# Patient Record
Sex: FEMALE | Race: WHITE | NOT HISPANIC OR LATINO | Employment: UNEMPLOYED | URBAN - METROPOLITAN AREA
[De-identification: names, ages, dates, MRNs, and addresses within clinical notes are randomized per-mention and may not be internally consistent; named-entity substitution may affect disease eponyms.]

---
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
## 2019-02-14 ENCOUNTER — INITIAL PRENATAL (OUTPATIENT)
Dept: OBSTETRICS AND GYNECOLOGY | Facility: CLINIC | Age: 19
End: 2019-02-14

## 2019-02-14 VITALS
SYSTOLIC BLOOD PRESSURE: 101 MMHG | BODY MASS INDEX: 22.99 KG/M2 | DIASTOLIC BLOOD PRESSURE: 73 MMHG | HEIGHT: 65 IN | WEIGHT: 138 LBS

## 2019-02-14 DIAGNOSIS — Z11.3 SCREEN FOR STD (SEXUALLY TRANSMITTED DISEASE): ICD-10-CM

## 2019-02-14 DIAGNOSIS — Z12.4 SCREENING FOR CERVICAL CANCER: ICD-10-CM

## 2019-02-14 DIAGNOSIS — O21.9 NAUSEA AND VOMITING DURING PREGNANCY: ICD-10-CM

## 2019-02-14 DIAGNOSIS — N92.6 MISSED MENSES: Primary | ICD-10-CM

## 2019-02-14 DIAGNOSIS — O99.331 TOBACCO SMOKING AFFECTING PREGNANCY IN FIRST TRIMESTER: ICD-10-CM

## 2019-02-14 DIAGNOSIS — O36.80X0 PREGNANCY WITH UNCERTAIN FETAL VIABILITY, SINGLE OR UNSPECIFIED FETUS: ICD-10-CM

## 2019-02-14 DIAGNOSIS — Z11.4 SCREENING FOR HIV (HUMAN IMMUNODEFICIENCY VIRUS): ICD-10-CM

## 2019-02-14 DIAGNOSIS — Z02.83 ENCOUNTER FOR DRUG SCREENING: ICD-10-CM

## 2019-02-14 PROBLEM — Z86.19 HISTORY OF HERPES GENITALIS: Status: ACTIVE | Noted: 2019-02-14

## 2019-02-14 LAB
B-HCG UR QL: POSITIVE
INTERNAL NEGATIVE CONTROL: NEGATIVE
INTERNAL POSITIVE CONTROL: POSITIVE
Lab: ABNORMAL

## 2019-02-14 PROCEDURE — 81025 URINE PREGNANCY TEST: CPT | Performed by: OBSTETRICS & GYNECOLOGY

## 2019-02-14 PROCEDURE — 99214 OFFICE O/P EST MOD 30 MIN: CPT | Performed by: OBSTETRICS & GYNECOLOGY

## 2019-02-14 PROCEDURE — 99406 BEHAV CHNG SMOKING 3-10 MIN: CPT | Performed by: OBSTETRICS & GYNECOLOGY

## 2019-02-14 RX ORDER — DIPHENHYDRAMINE HYDROCHLORIDE 25 MG/1
25 CAPSULE ORAL 3 TIMES DAILY
Qty: 90 TABLET | Refills: 2 | Status: SHIPPED | OUTPATIENT
Start: 2019-02-14 | End: 2019-09-03

## 2019-02-14 RX ORDER — CALCIUM CITRATE, IRON PENTACARBONYL, CHOLECALCIFEROL, .ALPHA.-TOCOPHEROL, DL-, PYRIDOXINE HYDROCHLORIDE, FOLIC ACID, DOCUSATE SODIUM, AND DOCONEXENT 104; 27; 400; 30; 25; 1; 50; 260 MG/1; MG/1; [IU]/1; [IU]/1; MG/1; MG/1; MG/1; MG/1
1 CAPSULE, GELATIN COATED ORAL DAILY
Qty: 30 CAPSULE | Refills: 11 | Status: SHIPPED | OUTPATIENT
Start: 2019-02-14 | End: 2019-11-06

## 2019-02-14 NOTE — PATIENT INSTRUCTIONS
Prenatal Care  WHAT IS PRENATAL CARE?  Prenatal care is the process of caring for a pregnant woman before she gives birth. Prenatal care makes sure that she and her baby remain as healthy as possible throughout pregnancy. Prenatal care may be provided by a midwife, family practice health care provider, or a childbirth and pregnancy specialist (obstetrician). Prenatal care may include physical examinations, testing, treatments, and education on nutrition, lifestyle, and social support services.  WHY IS PRENATAL CARE SO IMPORTANT?  Early and consistent prenatal care increases the chance that you and your baby will remain healthy throughout your pregnancy. This type of care also decreases a baby’s risk of being born too early (prematurely), or being born smaller than expected (small for gestational age). Any underlying medical conditions you may have that could pose a risk during your pregnancy are discussed during prenatal care visits. You will also be monitored regularly for any new conditions that may arise during your pregnancy so they can be treated quickly and effectively.  WHAT HAPPENS DURING PRENATAL CARE VISITS?  Prenatal care visits may include the following:  Discussion  Tell your health care provider about any new signs or symptoms you have experienced since your last visit. These might include:  · Nausea or vomiting.  · Increased or decreased level of energy.  · Difficulty sleeping.  · Back or leg pain.  · Weight changes.  · Frequent urination.  · Shortness of breath with physical activity.  · Changes in your skin, such as the development of a rash or itchiness.  · Vaginal discharge or bleeding.  · Feelings of excitement or nervousness.  · Changes in your baby’s movements.    You may want to write down any questions or topics you want to discuss with your health care provider and bring them with you to your appointment.  Examination  During your first prenatal care visit, you will likely have a complete  physical exam. Your health care provider will often examine your vagina, cervix, and the position of your uterus, as well as check your heart, lungs, and other body systems. As your pregnancy progresses, your health care provider will measure the size of your uterus and your baby’s position inside your uterus. He or she may also examine you for early signs of labor. Your prenatal visits may also include checking your blood pressure and, after about 10-12 weeks of pregnancy, listening to your baby’s heartbeat.  Testing  Regular testing often includes:  · Urinalysis. This checks your urine for glucose, protein, or signs of infection.  · Blood count. This checks the levels of white and red blood cells in your body.  · Tests for sexually transmitted infections (STIs). Testing for STIs at the beginning of pregnancy is routinely done and is required in many states.  · Antibody testing. You will be checked to see if you are immune to certain illnesses, such as rubella, that can affect a developing fetus.  · Glucose screen. Around 24-28 weeks of pregnancy, your blood glucose level will be checked for signs of gestational diabetes. Follow-up tests may be recommended.  · Group B strep. This is a bacteria that is commonly found inside a woman’s vagina. This test will inform your health care provider if you need an antibiotic to reduce the amount of this bacteria in your body prior to labor and childbirth.  · Ultrasound. Many pregnant women undergo an ultrasound screening around 18-20 weeks of pregnancy to evaluate the health of the fetus and check for any developmental abnormalities.  · HIV (human immunodeficiency virus) testing. Early in your pregnancy, you will be screened for HIV. If you are at high risk for HIV, this test may be repeated during your third trimester of pregnancy.    You may be offered other testing based on your age, personal or family medical history, or other factors.  HOW OFTEN SHOULD I PLAN TO SEE MY  HEALTH CARE PROVIDER FOR PRENATAL CARE?  Your prenatal care check-up schedule depends on any medical conditions you have before, or develop during, your pregnancy. If you do not have any underlying medical conditions, you will likely be seen for checkups:  · Monthly, during the first 6 months of pregnancy.  · Twice a month during months 7 and 8 of pregnancy.  · Weekly starting in the 9th month of pregnancy and until delivery.    If you develop signs of early labor or other concerning signs or symptoms, you may need to see your health care provider more often. Ask your health care provider what prenatal care schedule is best for you.  WHAT CAN I DO TO KEEP MYSELF AND MY BABY AS HEALTHY AS POSSIBLE DURING MY PREGNANCY?  · Take a prenatal vitamin containing 400 micrograms (0.4 mg) of folic acid every day. Your health care provider may also ask you to take additional vitamins such as iodine, vitamin D, iron, copper, and zinc.  · Take 7660-9086 mg of calcium daily starting at your 20th week of pregnancy until you deliver your baby.  · Make sure you are up to date on your vaccinations. Unless directed otherwise by your health care provider:  ? You should receive a tetanus, diphtheria, and pertussis (Tdap) vaccination between the 27th and 36th week of your pregnancy, regardless of when your last Tdap immunization occurred. This helps protect your baby from whooping cough (pertussis) after he or she is born.  ? You should receive an annual inactivated influenza vaccine (IIV) to help protect you and your baby from influenza. This can be done at any point during your pregnancy.  · Eat a well-rounded diet that includes:  ? Fresh fruits and vegetables.  ? Lean proteins.  ? Calcium-rich foods such as milk, yogurt, hard cheeses, and dark, leafy greens.  ? Whole grain breads.  · Do not eat seafood high in mercury, including:  ? Swordfish.  ? Tilefish.  ? Shark.  ? Feroz mackerel.  ? More than 6 oz tuna per week.  · Do not  eat:  ? Raw or undercooked meats or eggs.  ? Unpasteurized foods, such as soft cheeses (brie, blue, or feta), juices, and milks.  ? Lunch meats.  ? Hot dogs that have not been heated until they are steaming.  · Drink enough water to keep your urine clear or pale yellow. For many women, this may be 10 or more 8 oz glasses of water each day. Keeping yourself hydrated helps deliver nutrients to your baby and may prevent the start of pre-term uterine contractions.  · Do not use any tobacco products including cigarettes, chewing tobacco, or electronic cigarettes. If you need help quitting, ask your health care provider.  · Do not drink beverages containing alcohol. No safe level of alcohol consumption during pregnancy has been determined.  · Do not use any illegal drugs. These can harm your developing baby or cause a miscarriage.  · Ask your health care provider or pharmacist before taking any prescription or over-the-counter medicines, herbs, or supplements.  · Limit your caffeine intake to no more than 200 mg per day.  · Exercise. Unless told otherwise by your health care provider, try to get 30 minutes of moderate exercise most days of the week. Do not  do high-impact activities, contact sports, or activities with a high risk of falling, such as horseback riding or downhill skiing.  · Get plenty of rest.  · Avoid anything that raises your body temperature, such as hot tubs and saunas.  · If you own a cat, do not empty its litter box. Bacteria contained in cat feces can cause an infection called toxoplasmosis. This can result in serious harm to the fetus.  · Stay away from chemicals such as insecticides, lead, mercury, and cleaning or paint products that contain solvents.  · Do not have any X-rays taken unless medically necessary.  · Take a childbirth and breastfeeding preparation class. Ask your health care provider if you need a referral or recommendation.    This information is not intended to replace advice given  to you by your health care provider. Make sure you discuss any questions you have with your health care provider.  Document Released: 12/20/2004 Document Revised: 05/22/2017 Document Reviewed: 03/04/2015  [employer] Interactive Patient Education © 2017 [employer]    For more information:    Quit Now [place]  1-800-QUIT-NOW  https://[place].quitlogix.org/en-[place]/  Steps to Quit Smoking  Smoking tobacco can be harmful to your health and can affect almost every organ in your body. Smoking puts you, and those around you, at risk for developing many serious chronic diseases. Quitting smoking is difficult, but it is one of the best things that you can do for your health. It is never too late to quit.  What are the benefits of quitting smoking?  When you quit smoking, you lower your risk of developing serious diseases and conditions, such as:  · Lung cancer or lung disease, such as COPD.  · Heart disease.  · Stroke.  · Heart attack.  · Infertility.  · Osteoporosis and bone fractures.  Additionally, symptoms such as coughing, wheezing, and shortness of breath may get better when you quit. You may also find that you get sick less often because your body is stronger at fighting off colds and infections. If you are pregnant, quitting smoking can help to reduce your chances of having a baby of low birth weight.  How do I get ready to quit?  When you decide to quit smoking, create a plan to make sure that you are successful. Before you quit:  · Pick a date to quit. Set a date within the next two weeks to give you time to prepare.  · Write down the reasons why you are quitting. Keep this list in places where you will see it often, such as on your bathroom mirror or in your car or wallet.  · Identify the people, places, things, and activities that make you want to smoke (triggers) and avoid them. Make sure to take these actions:  ¨ Throw away all cigarettes at home, at work, and in your car.  ¨ Throw away smoking accessories,  such as ashtrays and lighters.  ¨ Clean your car and make sure to empty the ashtray.  ¨ Clean your home, including curtains and carpets.  · Tell your family, friends, and coworkers that you are quitting. Support from your loved ones can make quitting easier.  · Talk with your health care provider about your options for quitting smoking.  · Find out what treatment options are covered by your health insurance.  What strategies can I use to quit smoking?  Talk with your healthcare provider about different strategies to quit smoking. Some strategies include:  · Quitting smoking altogether instead of gradually lessening how much you smoke over a period of time. Research shows that quitting “cold turkey” is more successful than gradually quitting.  · Attending in-person counseling to help you build problem-solving skills. You are more likely to have success in quitting if you attend several counseling sessions. Even short sessions of 10 minutes can be effective.  · Finding resources and support systems that can help you to quit smoking and remain smoke-free after you quit. These resources are most helpful when you use them often. They can include:  ¨ Online chats with a counselor.  ¨ Telephone quitlines.  ¨ Printed self-help materials.  ¨ Support groups or group counseling.  ¨ Text messaging programs.  ¨ Mobile phone applications.  · Taking medicines to help you quit smoking. (If you are pregnant or breastfeeding, talk with your health care provider first.) Some medicines contain nicotine and some do not. Both types of medicines help with cravings, but the medicines that include nicotine help to relieve withdrawal symptoms. Your health care provider may recommend:  ¨ Nicotine patches, gum, or lozenges.  ¨ Nicotine inhalers or sprays.  ¨ Non-nicotine medicine that is taken by mouth.  Talk with your health care provider about combining strategies, such as taking medicines while you are also receiving in-person counseling.  Using these two strategies together makes you more likely to succeed in quitting than if you used either strategy on its own.  If you are pregnant or breastfeeding, talk with your health care provider about finding counseling or other support strategies to quit smoking. Do not take medicine to help you quit smoking unless told to do so by your health care provider.  What things can I do to make it easier to quit?  Quitting smoking might feel overwhelming at first, but there is a lot that you can do to make it easier. Take these important actions:  · Reach out to your family and friends and ask that they support and encourage you during this time. Call telephone quitlines, reach out to support groups, or work with a counselor for support.  · Ask people who smoke to avoid smoking around you.  · Avoid places that trigger you to smoke, such as bars, parties, or smoke-break areas at work.  · Spend time around people who do not smoke.  · Lessen stress in your life, because stress can be a smoking trigger for some people. To lessen stress, try:  ¨ Exercising regularly.  ¨ Deep-breathing exercises.  ¨ Yoga.  ¨ Meditating.  ¨ Performing a body scan. This involves closing your eyes, scanning your body from head to toe, and noticing which parts of your body are particularly tense. Purposefully relax the muscles in those areas.  · Download or purchase mobile phone or tablet apps (applications) that can help you stick to your quit plan by providing reminders, tips, and encouragement. There are many free apps, such as QuitGuide from the CDC (Centers for Disease Control and Prevention). You can find other support for quitting smoking (smoking cessation) through smokefree.gov and other websites.  How will I feel when I quit smoking?  Within the first 24 hours of quitting smoking, you may start to feel some withdrawal symptoms. These symptoms are usually most noticeable 2-3 days after quitting, but they usually do not last beyond  2-3 weeks. Changes or symptoms that you might experience include:  · Mood swings.  · Restlessness, anxiety, or irritation.  · Difficulty concentrating.  · Dizziness.  · Strong cravings for sugary foods in addition to nicotine.  · Mild weight gain.  · Constipation.  · Nausea.  · Coughing or a sore throat.  · Changes in how your medicines work in your body.  · A depressed mood.  · Difficulty sleeping (insomnia).  After the first 2-3 weeks of quitting, you may start to notice more positive results, such as:  · Improved sense of smell and taste.  · Decreased coughing and sore throat.  · Slower heart rate.  · Lower blood pressure.  · Clearer skin.  · The ability to breathe more easily.  · Fewer sick days.  Quitting smoking is very challenging for most people. Do not get discouraged if you are not successful the first time. Some people need to make many attempts to quit before they achieve long-term success. Do your best to stick to your quit plan, and talk with your health care provider if you have any questions or concerns.  This information is not intended to replace advice given to you by your health care provider. Make sure you discuss any questions you have with your health care provider.  Document Released: 12/12/2002 Document Revised: 08/15/2017 Document Reviewed: 05/03/2016  [employer]vier Interactive Patient Education © 2017 [employer]

## 2019-02-15 LAB
ABO GROUP BLD: (no result)
ALBUMIN SERPL-MCNC: 4.7 G/DL (ref 3.5–5.5)
ALBUMIN/GLOB SERPL: 2 {RATIO} (ref 1.2–2.2)
ALP SERPL-CCNC: 75 IU/L (ref 39–117)
ALT SERPL-CCNC: 10 IU/L (ref 0–32)
AST SERPL-CCNC: 12 IU/L (ref 0–40)
BASOPHILS # BLD AUTO: 0 X10E3/UL (ref 0–0.2)
BASOPHILS NFR BLD AUTO: 0 %
BILIRUB SERPL-MCNC: 0.4 MG/DL (ref 0–1.2)
BLD GP AB SCN SERPL QL: NEGATIVE
BUN SERPL-MCNC: 6 MG/DL (ref 6–20)
BUN/CREAT SERPL: 9 (ref 9–23)
CALCIUM SERPL-MCNC: 9.8 MG/DL (ref 8.7–10.2)
CHLORIDE SERPL-SCNC: 105 MMOL/L (ref 96–106)
CO2 SERPL-SCNC: 18 MMOL/L (ref 20–29)
CREAT SERPL-MCNC: 0.64 MG/DL (ref 0.57–1)
EOSINOPHIL # BLD AUTO: 0.1 X10E3/UL (ref 0–0.4)
EOSINOPHIL NFR BLD AUTO: 1 %
ERYTHROCYTE [DISTWIDTH] IN BLOOD BY AUTOMATED COUNT: 13.1 % (ref 12.3–15.4)
GLOBULIN SER CALC-MCNC: 2.4 G/DL (ref 1.5–4.5)
GLUCOSE SERPL-MCNC: 84 MG/DL (ref 65–99)
HBV SURFACE AG SERPL QL IA: NEGATIVE
HCG INTACT+B SERPL-ACNC: NORMAL MIU/ML
HCT VFR BLD AUTO: 46 % (ref 34–46.6)
HCV AB S/CO SERPL IA: <0.1 S/CO RATIO (ref 0–0.9)
HGB BLD-MCNC: 15.8 G/DL (ref 11.1–15.9)
HIV 1+2 AB+HIV1 P24 AG SERPL QL IA: NON REACTIVE
IMM GRANULOCYTES # BLD AUTO: 0 X10E3/UL (ref 0–0.1)
IMM GRANULOCYTES NFR BLD AUTO: 0 %
LYMPHOCYTES # BLD AUTO: 2.5 X10E3/UL (ref 0.7–3.1)
LYMPHOCYTES NFR BLD AUTO: 19 %
MCH RBC QN AUTO: 31.3 PG (ref 26.6–33)
MCHC RBC AUTO-ENTMCNC: 34.3 G/DL (ref 31.5–35.7)
MCV RBC AUTO: 91 FL (ref 79–97)
MONOCYTES # BLD AUTO: 0.9 X10E3/UL (ref 0.1–0.9)
MONOCYTES NFR BLD AUTO: 7 %
NEUTROPHILS # BLD AUTO: 10 X10E3/UL (ref 1.4–7)
NEUTROPHILS NFR BLD AUTO: 73 %
PLATELET # BLD AUTO: 320 X10E3/UL (ref 150–379)
POTASSIUM SERPL-SCNC: 3.9 MMOL/L (ref 3.5–5.2)
PROT SERPL-MCNC: 7.1 G/DL (ref 6–8.5)
RBC # BLD AUTO: 5.05 X10E6/UL (ref 3.77–5.28)
RH BLD: NEGATIVE
RPR SER QL: NON REACTIVE
RUBV IGG SERPL IA-ACNC: <0.9 INDEX
SODIUM SERPL-SCNC: 139 MMOL/L (ref 134–144)
WBC # BLD AUTO: 13.5 X10E3/UL (ref 3.4–10.8)

## 2019-02-15 NOTE — PROGRESS NOTES
Chief complaint: Initial prenatal visit, nausea and vomiting  History of present illness: Patient presents for her initial pregnancy visit with me.  She has basically no idea when her last menstrual period was.  She says she does not think she has had a menstrual period for over a year.  She says that she had a Nexplanon taken out about a year ago and never have periods after that.  She states that she had a positive home pregnancy test around 19.  She started having some abdominal pains and nausea and vomiting, so she went to the emergency room at [facility] on 19.  At that visit, her hCG was only 728.  Ultrasound was performed but showed no intra-or extrauterine pregnancy.  Patient states that she has been taking Phenergan since then with not much improvement in her nausea and vomiting.  She denies any pelvic or abdominal pain at this time.  No bleeding.    Obstetric History       T1      L1     SAB0   TAB0   Ectopic0   Molar0   Multiple0   Live Births0       # Outcome Date GA Lbr Peter/2nd Weight Sex Delivery Anes PTL Lv   2 Current            1 Term  38w0d  3487 g (7 lb 11 oz) F Vag-Spont           History reviewed. No pertinent past medical history.    History reviewed. No pertinent surgical history.    History reviewed. No pertinent family history.    Social History     Tobacco Use   • Smoking status: Current Every Day Smoker     Packs/day: 1.00     Types: Cigarettes   • Smokeless tobacco: Never Used   Substance Use Topics   • Alcohol use: No     Frequency: Never   • Drug use: No       Current Outpatient Medications on File Prior to Visit   Medication Sig   • [DISCONTINUED] PRENATAL VIT-FE FUMARATE-FA PO Take  by mouth.     No current facility-administered medications on file prior to visit.      Allergies   Allergen Reactions   • Azithromycin Hives       ROS:  General: No fever or chills  Constitutional: No weight loss or gain, no hair loss  HENT: No headache, no hearing loss, no  "tinnitus  Eyes: normal vision, no eye pain  Lungs: No cough, no shortness of breath  Heart: No chest pain, no palpitations  Abdomen: Pos nausea, vomiting, No constipation or diarrhea  : No dysuria, no hematuria  Skin: No rashes  Lymph: No swelling  Neuro: No parathesia, no weakness  Psych: Normal though content, no hallucinations, no SI/HI    PE:  Vitals:    19 1137 19 1137   BP: 101/73    Weight: 62.6 kg (138 lb)    Height:  165.1 cm (65\")       See initial prenatal flow sheet for physical exam    Assessment:  1.  19-year-old  2 para 1 at about 6 weeks gestation based on history and physical exam today  2.  Nausea and vomiting in pregnancy  3.  Tobacco use in pregnancy    Plan:  .1. Initial prenatal counseling performed today. Hospital and call coverage system discussed. Pt is recommended to start PNV. Prenatal care educational handouts provided in after visit summary. OTC medications discussed. Discussed typical schedule of prenatal visits. Obtain US next available for dating. ROutine labs today with Pap, cultures, and OB profile panel.  2. It is unusual that patient would start having so much nausea and vomiting so early in her pregnancy.  When she was first experiencing the nausea and vomiting her hCG level was only 728.  Have to wonder if there is some other source of her nausea and vomiting.  Patient reports that she had nausea and vomiting throughout her entire first pregnancy as well. Discussed nausea and vomiting in pregnancy. Discussed diet and lifestyle modifications to help prevent nausea. Discussed use of vitamin B6 and doxylamine up to three times a day as first line pharmacologic therapy. Will send prescription to pharmacy. Weight gain expectations discussed with pt.  3.  The risk of tobacco use in general and especially in pregnancy was discussed with the patient.  Full cessation is encouraged.  Greater than 3 minutes spent in tobacco cessation counseling  I explained to the " patient that I will be leaving this practice after 6/30/19, and that likely she would need to transition to care to 1 of the other doctors in the practice.  She verbalized understanding.

## 2019-02-16 LAB
AMPHETAMINES SERPL QL SCN: NEGATIVE NG/ML
BACTERIA UR CULT: NORMAL
BACTERIA UR CULT: NORMAL
BARBITURATES SERPL QL SCN: NEGATIVE UG/ML
BENZODIAZ SERPL QL SCN: NEGATIVE NG/ML
CANNABINOIDS SERPL QL SCN: NEGATIVE NG/ML
COCAINE+BZE SERPL QL SCN: NEGATIVE NG/ML
METHADONE SERPL QL SCN: NEGATIVE NG/ML
OPIATES SERPL QL SCN: NEGATIVE NG/ML
OXYCODONE+OXYMORPHONE SERPLBLD QL SCN: NEGATIVE NG/ML
PCP SERPL QL SCN: NEGATIVE NG/ML
PROPOXYPH SERPL QL SCN: NEGATIVE NG/ML

## 2019-02-19 ENCOUNTER — PROCEDURE VISIT (OUTPATIENT)
Dept: OBSTETRICS AND GYNECOLOGY | Facility: CLINIC | Age: 19
End: 2019-02-19

## 2019-02-19 DIAGNOSIS — Z34.91 PREGNANCY WITH UNCERTAIN DATES IN FIRST TRIMESTER: Primary | ICD-10-CM

## 2019-02-19 LAB
C TRACH RRNA CVX QL NAA+PROBE: POSITIVE
CONV .: ABNORMAL
CYTOLOGIST CVX/VAG CYTO: ABNORMAL
CYTOLOGY CVX/VAG DOC THIN PREP: ABNORMAL
DX ICD CODE: ABNORMAL
HIV 1 & 2 AB SER-IMP: ABNORMAL
Lab: ABNORMAL
N GONORRHOEA RRNA CVX QL NAA+PROBE: NEGATIVE
OTHER STN SPEC: ABNORMAL
PATH REPORT.FINAL DX SPEC: ABNORMAL
STAT OF ADQ CVX/VAG CYTO-IMP: ABNORMAL
T VAGINALIS RRNA SPEC QL NAA+PROBE: NEGATIVE

## 2019-02-19 PROCEDURE — 76817 TRANSVAGINAL US OBSTETRIC: CPT | Performed by: OBSTETRICS & GYNECOLOGY

## 2019-02-20 DIAGNOSIS — O98.811 CHLAMYDIA INFECTION AFFECTING PREGNANCY IN FIRST TRIMESTER: Primary | ICD-10-CM

## 2019-02-20 DIAGNOSIS — A74.9 CHLAMYDIA INFECTION AFFECTING PREGNANCY IN FIRST TRIMESTER: Primary | ICD-10-CM

## 2019-02-20 RX ORDER — AZITHROMYCIN 500 MG/1
TABLET, FILM COATED ORAL
Qty: 2 TABLET | Refills: 0 | Status: SHIPPED | OUTPATIENT
Start: 2019-02-20 | End: 2019-03-18

## 2019-02-21 ENCOUNTER — TELEPHONE (OUTPATIENT)
Dept: OBSTETRICS AND GYNECOLOGY | Facility: CLINIC | Age: 19
End: 2019-02-21

## 2019-02-21 NOTE — TELEPHONE ENCOUNTER
Left message to call office. JOHN        ----- Message from Aj Hoffmann MD sent at 2/20/2019  1:51 PM EST -----  Notify the patient that her chlamydia test was positive.  I sent in a prescription for azithromycin.  The patient has azithromycin listed as an allergy, with a reaction as hives.  Unfortunately, there are not many other safe alternatives to treat chlamydia in pregnancy other than azithromycin.  I think she still she needs to try to take the medicine.  She can try to take Benadryl before she takes it and then again 6 hours later if she starts to have hives.  This is only a one-time dose and will be able to treat the chlamydia infection appropriately.  She will need to notify her partner and he will need to be tested and/or treated as well.  They must abstain from sexual intercourse until 7 days after both have completed treatment.

## 2019-02-25 ENCOUNTER — TELEPHONE (OUTPATIENT)
Dept: OBSTETRICS AND GYNECOLOGY | Facility: CLINIC | Age: 19
End: 2019-02-25

## 2019-02-25 NOTE — TELEPHONE ENCOUNTER
----- Message from Aj Hoffmann MD sent at 2/20/2019  1:51 PM EST -----  Notify the patient that her chlamydia test was positive.  I sent in a prescription for azithromycin.  The patient has azithromycin listed as an allergy, with a reaction as hives.  Unfortunately, there are not many other safe alternatives to treat chlamydia in pregnancy other than azithromycin.  I think she still she needs to try to take the medicine.  She can try to take Benadryl before she takes it and then again 6 hours later if she starts to have hives.  This is only a one-time dose and will be able to treat the chlamydia infection appropriately.  She will need to notify her partner and he will need to be tested and/or treated as well.  They must abstain from sexual intercourse until 7 days after both have completed treatment.

## 2019-02-28 ENCOUNTER — TELEPHONE (OUTPATIENT)
Dept: OBSTETRICS AND GYNECOLOGY | Facility: CLINIC | Age: 19
End: 2019-02-28

## 2019-02-28 NOTE — TELEPHONE ENCOUNTER
Letter sent out on 2/28/2019. JOHN      ----- Message from Aj Hoffmann MD sent at 2/20/2019  1:51 PM EST -----  Notify the patient that her chlamydia test was positive.  I sent in a prescription for azithromycin.  The patient has azithromycin listed as an allergy, with a reaction as hives.  Unfortunately, there are not many other safe alternatives to treat chlamydia in pregnancy other than azithromycin.  I think she still she needs to try to take the medicine.  She can try to take Benadryl before she takes it and then again 6 hours later if she starts to have hives.  This is only a one-time dose and will be able to treat the chlamydia infection appropriately.  She will need to notify her partner and he will need to be tested and/or treated as well.  They must abstain from sexual intercourse until 7 days after both have completed treatment.

## 2019-03-18 ENCOUNTER — ROUTINE PRENATAL (OUTPATIENT)
Dept: OBSTETRICS AND GYNECOLOGY | Facility: CLINIC | Age: 19
End: 2019-03-18

## 2019-03-18 VITALS — WEIGHT: 133 LBS | SYSTOLIC BLOOD PRESSURE: 125 MMHG | DIASTOLIC BLOOD PRESSURE: 79 MMHG | BODY MASS INDEX: 22.13 KG/M2

## 2019-03-18 DIAGNOSIS — O21.9 NAUSEA AND VOMITING DURING PREGNANCY: ICD-10-CM

## 2019-03-18 DIAGNOSIS — Z34.81 MULTIGRAVIDA IN FIRST TRIMESTER: Primary | ICD-10-CM

## 2019-03-18 DIAGNOSIS — Z36.0 ENCOUNTER FOR ANTENATAL SCREENING FOR CHROMOSOMAL ANOMALIES: ICD-10-CM

## 2019-03-18 DIAGNOSIS — Z34.81 MULTIGRAVIDA IN FIRST TRIMESTER: ICD-10-CM

## 2019-03-18 DIAGNOSIS — Z11.3 SCREEN FOR STD (SEXUALLY TRANSMITTED DISEASE): ICD-10-CM

## 2019-03-18 DIAGNOSIS — O99.331 TOBACCO SMOKING AFFECTING PREGNANCY IN FIRST TRIMESTER: ICD-10-CM

## 2019-03-18 LAB — EXTERNAL NIPT: NORMAL

## 2019-03-18 PROCEDURE — 99213 OFFICE O/P EST LOW 20 MIN: CPT | Performed by: OBSTETRICS & GYNECOLOGY

## 2019-03-18 NOTE — PROGRESS NOTES
Chief complaint: Prenatal visit, nausea and vomiting  History of present illness: Patient is here for her routine prenatal visit.  She has noticed some improvement in her nausea and vomiting.  She is still taking vitamin B6 and doxylamine.  She has lost 6 pounds since her last visit, but feels she is doing better.  She denies vaginal bleeding or leakage of fluid.  Patient reports that she did take the azithromycin for her chlamydia infection, but she did vomit a small amount about 30 minutes later.  She did not see the pills come back up.  She is otherwise doing well today.  Objective: See vital signs and flowsheet  General: No acute distress, awake and oriented x3  Abdomen: Soft, nontender, no hernias or masses  Pelvic exam: Normal external female genitalia, no lesions  Extremities: No lower extremity edema, no tenderness  Psychiatric: Good judgment and insight, normal affect and mood  Labs: Prenatal labs reviewed, flowsheet updated  Ultrasound: Estimated due date 10/10/19 based on ultrasound at 6 weeks  Assessment:  1.  19-year-old  2 para 1 at 10-4/7 weeks gestational age  2.  Rh-  3.  Nausea and vomiting of pregnancy, improving  4.  Recent chlamydia infection, status post treatment  Plan:  1.  We will do test of cure for chlamydia today.  2.  We discussed dietary lifestyle modifications to help with nausea and vomiting.  She does seem to be improving.  She may continue vitamin B6 and doxylamine for now.  3.  Patient request cell free DNA screening for aneuploidy today.  Notations of this testing was explained.  She will proceed with doing this test today.  She will need to go to our other office location to have it drawn.She is instructed to call our office for the results if she has not heard them after 1 week.   Return to the office in 4 weeks for OB follow-up.

## 2019-03-19 LAB
C TRACH RRNA SPEC QL NAA+PROBE: NEGATIVE
N GONORRHOEA RRNA SPEC QL NAA+PROBE: NEGATIVE
T VAGINALIS RRNA VAG QL NAA+PROBE: NEGATIVE

## 2019-03-20 ENCOUNTER — TELEPHONE (OUTPATIENT)
Dept: OBSTETRICS AND GYNECOLOGY | Facility: CLINIC | Age: 19
End: 2019-03-20

## 2019-03-25 LAB
# FETUSES US: 1
CFDNA.FET/CFDNA.TOTAL SFR FETUS: 10.3 %
CHR X + Y ANEUP PLAS.CFDNA: NORMAL
CITATION REF LAB TEST: NORMAL
CYTOGENETICS STUDY: NORMAL
FET 13+18+21+X+Y ANEUP PLAS.CFDNA: NORMAL
FET CHR 13 TS PLAS.CFDNA QL: NORMAL
FET CHR 13 TS PLAS.CFDNA QL: NORMAL
FET CHR 18 TS PLAS.CFDNA QL: NORMAL
FET CHR 18 TS PLAS.CFDNA QL: NORMAL
FET CHR 21 TS PLAS.CFDNA QL: NORMAL
FET CHR 21 TS PLAS.CFDNA QL: NORMAL
FET CHROM X + Y ANEUP CFDNA IMP: NORMAL
GA: 10.6 WEEKS
GENETIC ALGORITHM SENSITIVITY: NORMAL %
LAB DIRECTOR NAME PROVIDER: NORMAL
Lab: NORMAL
REASON FOR REFERRAL (NARRATIVE): NORMAL
REF LAB TEST METHOD: NORMAL
SERVICE CMNT 02-IMP: NORMAL
SERVICE CMNT-IMP: NORMAL

## 2019-03-26 ENCOUNTER — TELEPHONE (OUTPATIENT)
Dept: OBSTETRICS AND GYNECOLOGY | Facility: CLINIC | Age: 19
End: 2019-03-26

## 2019-03-26 NOTE — TELEPHONE ENCOUNTER
Patient is aware informaseq results are normal. She does not want to know the gender at this time.

## 2019-03-26 NOTE — TELEPHONE ENCOUNTER
Left message to call office. JOHN    ----- Message from Aj Hoffmann MD sent at 3/26/2019  9:39 AM EDT -----  Notify the patient that the chromosomal test was normal.  If she would like to know the gender of the baby, it is a boy.

## 2019-03-27 ENCOUNTER — TELEPHONE (OUTPATIENT)
Dept: OBSTETRICS AND GYNECOLOGY | Facility: CLINIC | Age: 19
End: 2019-03-27

## 2019-03-27 NOTE — TELEPHONE ENCOUNTER
OB pt has not had a bowel movement in 4 days. She has tried stool softener and did not work. Pt states that is is having a lot of discomfort and would like relief. Denies any bleeding or cramping.       Thank you      Pt ask us to call her back at [phone]

## 2019-04-15 ENCOUNTER — ROUTINE PRENATAL (OUTPATIENT)
Dept: OBSTETRICS AND GYNECOLOGY | Facility: CLINIC | Age: 19
End: 2019-04-15

## 2019-04-15 VITALS — BODY MASS INDEX: 21.8 KG/M2 | WEIGHT: 131 LBS | DIASTOLIC BLOOD PRESSURE: 69 MMHG | SYSTOLIC BLOOD PRESSURE: 114 MMHG

## 2019-04-15 DIAGNOSIS — K59.09 OTHER CONSTIPATION: ICD-10-CM

## 2019-04-15 DIAGNOSIS — Z34.81 MULTIGRAVIDA IN FIRST TRIMESTER: Primary | ICD-10-CM

## 2019-04-15 PROCEDURE — 99213 OFFICE O/P EST LOW 20 MIN: CPT | Performed by: OBSTETRICS & GYNECOLOGY

## 2019-04-15 RX ORDER — DOCUSATE SODIUM 100 MG/1
100 CAPSULE, LIQUID FILLED ORAL DAILY PRN
Qty: 30 CAPSULE | Refills: 5 | Status: SHIPPED | OUTPATIENT
Start: 2019-04-15 | End: 2019-09-03

## 2019-04-15 NOTE — PROGRESS NOTES
Chief complaint: Prenatal visit  History of present illness: Patient is here for her routine prenatal visit.  She reports vague symptoms in the lower extremities.  She states that occasionally they look purple to her.  She also notes some occasional numbness in her calves.  No swelling.  No weakness.  She is also still complaining of constipation.  She has tried Colace intermittently as well as milk of magnesia intermittently.  She says her nausea and vomiting has resolved.  She has a normal appetite.  No fetal movement.  She denies fevers and chills.  Objective: See vital signs and flowsheet  General: No acute distress, awake and oriented x3  Abdomen: Soft, nontender, nondistended, no hernias or masses, fetal heart tones 140  Extremities: Normal lower extremities.  There is no discoloration or rashes, there is no swelling, she has normal strength and sensation throughout  Psychiatric: Good judgment and insight, normal affect and mood  Neurologic: Cranial nerves II through XII intact, no deficits  Labs: Cell free DNA testing 46 XY  Assessment:  1.  19-year-old  2 para 1 at 14-4/7 weeks gestational age  2.  Constipation  3.  Nausea and vomiting in pregnancy, resolved  Plan:  1.  No concerning findings regarding the lower extremities.  They appear completely normal to me today.  Reassurance is offered to the patient.  2.  Discussed recent lab results.  Limitations of cell free DNA testing were explained.  3.  I would recommend that she increase Colace to daily.  She may also start Metamucil.  She can continue milk of magnesia as needed.  Increase oral hydration.  4.  Return to the office in 4 weeks.  Ultrasound for anatomy in 4 weeks

## 2019-05-14 ENCOUNTER — PROCEDURE VISIT (OUTPATIENT)
Dept: OBSTETRICS AND GYNECOLOGY | Facility: CLINIC | Age: 19
End: 2019-05-14

## 2019-05-14 ENCOUNTER — ROUTINE PRENATAL (OUTPATIENT)
Dept: OBSTETRICS AND GYNECOLOGY | Facility: CLINIC | Age: 19
End: 2019-05-14

## 2019-05-14 VITALS — BODY MASS INDEX: 22.96 KG/M2 | DIASTOLIC BLOOD PRESSURE: 68 MMHG | SYSTOLIC BLOOD PRESSURE: 104 MMHG | WEIGHT: 138 LBS

## 2019-05-14 DIAGNOSIS — Z34.82 MULTIGRAVIDA IN SECOND TRIMESTER: Primary | ICD-10-CM

## 2019-05-14 DIAGNOSIS — Z36.3 SCREENING, ANTENATAL, FOR MALFORMATION BY ULTRASOUND: Primary | ICD-10-CM

## 2019-05-14 PROBLEM — Z36.0 ENCOUNTER FOR ANTENATAL SCREENING FOR CHROMOSOMAL ANOMALIES: Status: RESOLVED | Noted: 2019-03-18 | Resolved: 2019-05-14

## 2019-05-14 PROCEDURE — 99213 OFFICE O/P EST LOW 20 MIN: CPT | Performed by: OBSTETRICS & GYNECOLOGY

## 2019-05-14 PROCEDURE — 76805 OB US >/= 14 WKS SNGL FETUS: CPT | Performed by: OBSTETRICS & GYNECOLOGY

## 2019-05-14 NOTE — PROGRESS NOTES
Chief complaint: Prenatal visit  History of present illness: Patient is here for routine prenatal visit.  She has no major complaints today.  Good fetal movement.  No vaginal bleeding or leakage of fluid.  Objective: See vital signs and flowsheet  General: No acute distress, awake and oriented x3  Abdomen: Soft, nontender, nondistended, no hernias or masses  Extremities: No lower extremity edema, no tenderness  Psychiatric: Good judgment insight, normal affect mood  Ultrasound today: Normal anatomic survey, normal growth  Assessment:  1.  19-year-old  2 para 1 at 18-5/7 weeks gestational age  2.  Rh-  Plan:  1.  Ultrasound findings were discussed with the patient today.  Limitations of ultrasound was explained.  Return to the office in 4 weeks for OB follow-up.  I spent 13 out of 15 minutes with the patient in face to face counseling of the above issues.

## 2019-06-04 ENCOUNTER — TELEPHONE (OUTPATIENT)
Dept: OBSTETRICS AND GYNECOLOGY | Facility: CLINIC | Age: 19
End: 2019-06-04

## 2019-06-04 NOTE — TELEPHONE ENCOUNTER
Patient is calling asking for a work note. (She stated it was previously discussed with you). Pt said because of illness she was out of work yesterday and today.       Thank you

## 2019-06-13 ENCOUNTER — TELEPHONE (OUTPATIENT)
Dept: OBSTETRICS AND GYNECOLOGY | Facility: CLINIC | Age: 19
End: 2019-06-13

## 2019-06-13 NOTE — TELEPHONE ENCOUNTER
Called pt about n/s on 6/11/19, she states she thought her appointment was on the 14th every month. Pt r/s'd.lizet

## 2019-06-20 ENCOUNTER — TELEPHONE (OUTPATIENT)
Dept: OBSTETRICS AND GYNECOLOGY | Facility: CLINIC | Age: 19
End: 2019-06-20

## 2019-07-22 ENCOUNTER — ROUTINE PRENATAL (OUTPATIENT)
Dept: OBSTETRICS AND GYNECOLOGY | Facility: CLINIC | Age: 19
End: 2019-07-22

## 2019-07-22 VITALS — WEIGHT: 148 LBS | DIASTOLIC BLOOD PRESSURE: 64 MMHG | BODY MASS INDEX: 24.63 KG/M2 | SYSTOLIC BLOOD PRESSURE: 103 MMHG

## 2019-07-22 DIAGNOSIS — Z34.80 SUPERVISION OF OTHER NORMAL PREGNANCY, ANTEPARTUM: Primary | ICD-10-CM

## 2019-07-22 PROCEDURE — 99214 OFFICE O/P EST MOD 30 MIN: CPT | Performed by: OBSTETRICS & GYNECOLOGY

## 2019-07-22 PROCEDURE — 96372 THER/PROPH/DIAG INJ SC/IM: CPT | Performed by: OBSTETRICS & GYNECOLOGY

## 2019-07-22 NOTE — PROGRESS NOTES
"Chief Complaint   Patient presents with   • Routine Prenatal Visit      Lara Tijerina is a 19 y.o.  at 28w4d   Reports a bump that showed up \"down there\" three weeks ago.  No pain.  Doesn't know what it is.  Here with her mom and states that she has not been seen because she has transportation issues.  Denies cramping, vaginal bleeding   Notes normal fetal movement    /64   Wt 67.1 kg (148 lb)   BMI 24.63 kg/m²    Gen: NAD, well appearing  Abd: gravid, nontender  Pelvic: 1 cm flesh-colored lesion on mons  See OB Flowsheet    ASSESSMENT:   1. IUP at 28w4d   2. Rh negative  3. Lapse in prenatal care  4. Cigarette smoking in pregnancy  5.  Condyloma acuminatum  PLAN:  Reviewed with patient that lesion is consistent with HPV, condyloma.  Reviewed treatment options.  Would recommend deferring till postpartum.  Discouraged smoking encouraged cessation  Reviewed importance of compliance with prenatal care as pt has had 10 week lapse in care  Repeat UDS  Rhogam today  GCT, CBC in next week. Pt agrees to go by lab for testing.    Return in about 1 day (around 2019) for GCT, 2 weeks OB visit.  Orders Placed This Encounter   Procedures   • Gestational Screen 1 Hr ([facility])   • CBC (No Diff)   • Drug Profile Urine - 9 Drugs - Urine, Clean Catch                "

## 2019-07-23 LAB
AMPHETAMINES UR QL SCN: NEGATIVE NG/ML
BARBITURATES UR QL SCN: NEGATIVE NG/ML
BENZODIAZ UR QL: NEGATIVE NG/ML
BZE UR QL: NEGATIVE NG/ML
CANNABINOIDS UR QL SCN: NEGATIVE NG/ML
METHADONE UR QL SCN: NEGATIVE NG/ML
OPIATES UR QL: NEGATIVE NG/ML
PCP UR QL: NEGATIVE NG/ML
PROPOXYPH UR QL SCN: NEGATIVE NG/ML

## 2019-07-25 ENCOUNTER — TELEPHONE (OUTPATIENT)
Dept: OBSTETRICS AND GYNECOLOGY | Facility: CLINIC | Age: 19
End: 2019-07-25

## 2019-07-25 LAB
ERYTHROCYTE [DISTWIDTH] IN BLOOD BY AUTOMATED COUNT: 12.3 % (ref 12.3–15.4)
GLUCOSE 1H P 50 G GLC PO SERPL-MCNC: 115 MG/DL (ref 65–139)
HCT VFR BLD AUTO: 39.4 % (ref 34–46.6)
HGB BLD-MCNC: 12.9 G/DL (ref 12–15.9)
MCH RBC QN AUTO: 31 PG (ref 26.6–33)
MCHC RBC AUTO-ENTMCNC: 32.7 G/DL (ref 31.5–35.7)
MCV RBC AUTO: 94.7 FL (ref 79–97)
PLATELET # BLD AUTO: 304 10*3/MM3 (ref 140–450)
RBC # BLD AUTO: 4.16 10*6/MM3 (ref 3.77–5.28)
WBC # BLD AUTO: 12.85 10*3/MM3 (ref 3.4–10.8)

## 2019-07-25 NOTE — TELEPHONE ENCOUNTER
----- Message from Jackie Verdin MD sent at 7/25/2019  7:59 AM EDT -----  Please let patient know that her one hour glucose testing (gestational diabetes testing) was normal.  Thanks!    07/25/19  Called patient to inform results, no answer. Left a message to return call.

## 2019-08-06 ENCOUNTER — ROUTINE PRENATAL (OUTPATIENT)
Dept: OBSTETRICS AND GYNECOLOGY | Facility: CLINIC | Age: 19
End: 2019-08-06

## 2019-08-06 VITALS — DIASTOLIC BLOOD PRESSURE: 61 MMHG | BODY MASS INDEX: 24.46 KG/M2 | WEIGHT: 147 LBS | SYSTOLIC BLOOD PRESSURE: 92 MMHG

## 2019-08-06 DIAGNOSIS — Z34.80 SUPERVISION OF OTHER NORMAL PREGNANCY, ANTEPARTUM: Primary | ICD-10-CM

## 2019-08-06 PROCEDURE — 90715 TDAP VACCINE 7 YRS/> IM: CPT | Performed by: OBSTETRICS & GYNECOLOGY

## 2019-08-06 PROCEDURE — 90471 IMMUNIZATION ADMIN: CPT | Performed by: OBSTETRICS & GYNECOLOGY

## 2019-08-06 PROCEDURE — 99213 OFFICE O/P EST LOW 20 MIN: CPT | Performed by: OBSTETRICS & GYNECOLOGY

## 2019-08-06 NOTE — PROGRESS NOTES
Chief Complaint   Patient presents with   • Routine Prenatal Visit      Lara Tijerina is a 19 y.o.  at 30w5d   Reports no issues  Denies cramping, vaginal bleeding   Notes normal fetal movement    BP 92/61   Wt 66.7 kg (147 lb)   BMI 24.46 kg/m²    Gen: NAD, well appearing  Abd: gravid, nontender  See OB Flowsheet    ASSESSMENT:   1. IUP at 30w5d   2. S<D, plan for growth US  3. Rh negative, s/p rhogam  4. Cigarette smoking in pregnancy  5. Condyloma acuminatum  PLAN:  Reviewed common symptoms of the third trimester.  Counseled on labor precautions and anticipated fetal movements.  Reviewed kick counts.  Patient is aware of the location of L&D.     Tdap today   Contraception considering IUD  No Follow-up on file.  No orders of the defined types were placed in this encounter.

## 2019-08-20 ENCOUNTER — PROCEDURE VISIT (OUTPATIENT)
Dept: OBSTETRICS AND GYNECOLOGY | Facility: CLINIC | Age: 19
End: 2019-08-20

## 2019-08-20 ENCOUNTER — ROUTINE PRENATAL (OUTPATIENT)
Dept: OBSTETRICS AND GYNECOLOGY | Facility: CLINIC | Age: 19
End: 2019-08-20

## 2019-08-20 VITALS — DIASTOLIC BLOOD PRESSURE: 61 MMHG | SYSTOLIC BLOOD PRESSURE: 101 MMHG | WEIGHT: 147 LBS | BODY MASS INDEX: 24.46 KG/M2

## 2019-08-20 DIAGNOSIS — Z34.80 SUPERVISION OF OTHER NORMAL PREGNANCY, ANTEPARTUM: Primary | ICD-10-CM

## 2019-08-20 DIAGNOSIS — O26.843 UTERINE SIZE-DATE DISCREPANCY IN THIRD TRIMESTER: Primary | ICD-10-CM

## 2019-08-20 PROCEDURE — 76816 OB US FOLLOW-UP PER FETUS: CPT | Performed by: OBSTETRICS & GYNECOLOGY

## 2019-08-20 PROCEDURE — 99214 OFFICE O/P EST MOD 30 MIN: CPT | Performed by: OBSTETRICS & GYNECOLOGY

## 2019-08-20 NOTE — PROGRESS NOTES
"Chief Complaint   Patient presents with   • Routine Prenatal Visit     pt reports losing her mucus plug 3 days ago.       Lara Tijerina is a 19 y.o.  at 32w5d   No contractions.  Still smoking 1ppd.   Denies cramping, vaginal bleeding   Notes fetal movement    /61   Wt 66.7 kg (147 lb)   BMI 24.46 kg/m²    Gen: NAD, well appearing  Abd: gravid, nontender  See OB Flowsheet    ASSESSMENT:   1. IUP at 32w5d   2. S<D last visit, growth US within normal limits   3. Rh negative, s/p rhogam  4. Cigarette smoking in pregnancy  5. Condyloma acuminatum  PLAN:  Smoking cessation  I advised patient to quit, and offered support.  I spent 5 minutes counseling the patient on benefits of smoking cessation and risks of continuing the behavior. Pt does not desire to stop.  Reviewed common symptoms of the third trimester.  Counseled on labor precautions and anticipated fetal movements.  Reviewed kick counts.  Patient is aware of the location of L&D.     Contraception:reports was on DMPA, pills, Nexplanon, and \"I didn't like any of them.\"  Reviewed other options. Pt undecided  Return in about 2 weeks (around 9/3/2019).  No orders of the defined types were placed in this encounter.               "

## 2019-09-03 ENCOUNTER — ROUTINE PRENATAL (OUTPATIENT)
Dept: OBSTETRICS AND GYNECOLOGY | Facility: CLINIC | Age: 19
End: 2019-09-03

## 2019-09-03 VITALS — BODY MASS INDEX: 25.63 KG/M2 | DIASTOLIC BLOOD PRESSURE: 78 MMHG | SYSTOLIC BLOOD PRESSURE: 116 MMHG | WEIGHT: 154 LBS

## 2019-09-03 DIAGNOSIS — Z34.80 SUPERVISION OF OTHER NORMAL PREGNANCY, ANTEPARTUM: Primary | ICD-10-CM

## 2019-09-03 LAB
BILIRUB BLD-MCNC: NEGATIVE MG/DL
CLARITY, POC: CLEAR
COLOR UR: YELLOW
GLUCOSE UR STRIP-MCNC: NEGATIVE MG/DL
KETONES UR QL: NEGATIVE
LEUKOCYTE EST, POC: ABNORMAL
NITRITE UR-MCNC: NEGATIVE MG/ML
PH UR: 7 [PH] (ref 5–8)
PROT UR STRIP-MCNC: NEGATIVE MG/DL
RBC # UR STRIP: NEGATIVE /UL
SP GR UR: 1.01 (ref 1–1.03)
UROBILINOGEN UR QL: NORMAL

## 2019-09-03 PROCEDURE — 99213 OFFICE O/P EST LOW 20 MIN: CPT | Performed by: OBSTETRICS & GYNECOLOGY

## 2019-09-03 RX ORDER — OMEPRAZOLE 40 MG/1
40 CAPSULE, DELAYED RELEASE ORAL DAILY
Qty: 30 CAPSULE | Refills: 1 | Status: SHIPPED | OUTPATIENT
Start: 2019-09-03 | End: 2019-11-06

## 2019-09-03 NOTE — PROGRESS NOTES
"Chief Complaint   Patient presents with   • Routine Prenatal Visit     pt rpeorts having diarrhea for a week. She reports not being able to got to work as she cannot hold food down and vomits all day everyday. She reports she has tried tums and everything she can possibly thing and nothing helps for the vominting.. She also reports sharp pain in \"the vagina\" when standing up.        Lara Tijerina is a 19 y.o.  at 34w5d   Reports heartburn with nausea - worse at night. Reports milk makes it better. Has not taken any medication for it.  C/o sharp, stabbing pain in lower back and in vagina. Has been taking tylenol. Lost some mucus a couple days ago.  Denies vaginal bleeding. No back CVA pain, no contractions.  No LOF.  Has had intermittent diarrhea  Notes normal fetal movement    /78   Wt 69.9 kg (154 lb)   BMI 25.63 kg/m²    Gen: NAD, well appearing  Abd: gravid, nontender  Back: no CVA tenderness  Pelvic: normal external genitalia, normal vagina, cervix multiparous, 0.5cm dilated, 20% effaced, -3, soft, mid position  See OB Flowsheet    ASSESSMENT:   1. IUP at 34w5d   2. GERD  3. Rh negative, s/p rhogam  4. Cigarette smoking in pregnancy  5. Condyloma acuminatum  6. Abdominal pain in pregnancy  PLAN:    Reviewed use of PPI for GERD.  Can use Tums in addition.  Discussed diarrhea can be normal but if worsening or associated with contractions, leakage of fluid would need to be evaluated for labor.  Cervical exam consistent with multiparous cervix.  Labor precautions discussed.  Chlamydia positive and first trimester with negative test of cure.  Repeat next visit.  UA with SG of 1.015, negative for ketones or nitrites  Needs HSV suppression at next visit.  Return in about 1 week (around 9/10/2019).  No orders of the defined types were placed in this encounter.               "

## 2019-09-17 ENCOUNTER — TELEPHONE (OUTPATIENT)
Dept: OBSTETRICS AND GYNECOLOGY | Facility: CLINIC | Age: 19
End: 2019-09-17

## 2019-09-17 ENCOUNTER — ROUTINE PRENATAL (OUTPATIENT)
Dept: OBSTETRICS AND GYNECOLOGY | Facility: CLINIC | Age: 19
End: 2019-09-17

## 2019-09-17 VITALS — SYSTOLIC BLOOD PRESSURE: 96 MMHG | DIASTOLIC BLOOD PRESSURE: 62 MMHG | BODY MASS INDEX: 25.79 KG/M2 | WEIGHT: 155 LBS

## 2019-09-17 DIAGNOSIS — Z34.80 SUPERVISION OF OTHER NORMAL PREGNANCY, ANTEPARTUM: Primary | ICD-10-CM

## 2019-09-17 PROCEDURE — 99213 OFFICE O/P EST LOW 20 MIN: CPT | Performed by: OBSTETRICS & GYNECOLOGY

## 2019-09-17 RX ORDER — VALACYCLOVIR HYDROCHLORIDE 500 MG/1
500 TABLET, FILM COATED ORAL 2 TIMES DAILY
Qty: 60 TABLET | Refills: 1 | Status: SHIPPED | OUTPATIENT
Start: 2019-09-17 | End: 2019-10-17

## 2019-09-17 NOTE — PROGRESS NOTES
"Chief Complaint   Patient presents with   • Routine Prenatal Visit     pt reports contractions but has not been timing as she states she does \"not think is that serious\"       Lara Tijerina is a 19 y.o.  at 36w5d   Occasional contractions but not regular  No vaginal bleeding or LOV  Notes normal fetal movement    BP 96/62   Wt 70.3 kg (155 lb)   BMI 25.79 kg/m²    Gen: NAD, well appearing  Abd: gravid, nontender  See OB Flowsheet    ASSESSMENT:   1. IUP at 36w5d   2. GERD  3. Rh negative, s/p rhogam  4. Cigarette smoking in pregnancy  5. Condyloma acuminatum    PLAN:  GBS today  Reviewed common symptoms of the third trimester.  Counseled on labor precautions and anticipated fetal movements.  Reviewed kick counts.  Patient is aware of the location of L&D.     Pt to start HSV suppression, no recent outbreaks  Return in about 1 week (around 2019).  Orders Placed This Encounter   Procedures   • Group B Streptococcus Culture - Swab, Vaginal/Rectum   • Drug Profile Urine - 9 Drugs - Urine, Clean Catch                "

## 2019-09-17 NOTE — TELEPHONE ENCOUNTER
Pt agreed to 10:45 today.  Prema will you please add to Dr. Verdin's schedule today for ob fu.    Thanks!

## 2019-09-17 NOTE — TELEPHONE ENCOUNTER
36wk 5d ob pt called to confirm her appt for today.  Appt was for yesterday.  May I overbook her to be seen today or next Tuesday (pt can only do Tuesday's).    Pt # [phone]

## 2019-09-17 NOTE — TELEPHONE ENCOUNTER
Yes, please remind her of our No Show policy as she has already had two No Show appointments with Dr. Hoffmann.  Also, as today is already overbooked there may be an extended wait. Thanks!

## 2019-09-21 ENCOUNTER — HOSPITAL ENCOUNTER (OUTPATIENT)
Facility: HOSPITAL | Age: 19
Setting detail: OBSERVATION
Discharge: HOME | End: 2019-09-21
Attending: OBSTETRICS & GYNECOLOGY | Admitting: OBSTETRICS & GYNECOLOGY

## 2019-09-21 VITALS
HEIGHT: 65 IN | BODY MASS INDEX: 25.83 KG/M2 | DIASTOLIC BLOOD PRESSURE: 76 MMHG | WEIGHT: 155 LBS | RESPIRATION RATE: 16 BRPM | TEMPERATURE: 98.2 F | SYSTOLIC BLOOD PRESSURE: 127 MMHG | HEART RATE: 82 BPM

## 2019-09-21 PROBLEM — Z34.90 PREGNANCY: Status: ACTIVE | Noted: 2019-09-21

## 2019-09-21 LAB — B-HEM STREP SPEC QL CULT: NEGATIVE

## 2019-09-21 PROCEDURE — G0378 HOSPITAL OBSERVATION PER HR: HCPCS

## 2019-09-21 PROCEDURE — 59025 FETAL NON-STRESS TEST: CPT

## 2019-09-21 PROCEDURE — 59025 FETAL NON-STRESS TEST: CPT | Performed by: OBSTETRICS & GYNECOLOGY

## 2019-09-21 NOTE — NURSING NOTE
Discharge instructions reviewed with patient.  Discussed fetal kick counts, leaking of fluids, vaginal bleeding.  Given labor s/s given.  Patient to continue to go to all regularly scheduled appointments.  Patient to return to L&D or call on call MD if any questions or concerns.

## 2019-09-21 NOTE — NON STRESS TEST
Lara Tijerina, a  at 37w2d with an DARBY of 10/10/2019, by Ultrasound, was seen at [facility] for a nonstress test.    Chief Complaint   Patient presents with   • Contractions     Patient complains of contractions every 15-20 minutes for the past hour.  Rates them 4/10 on the pain scale.  Radiating to back.  Has been 2cm in the office.  Good FM, no VB, no LOF.       Patient Active Problem List   Diagnosis   • Tobacco smoking affecting pregnancy in first trimester   • Nausea and vomiting during pregnancy   • History of herpes genitalis   • Chlamydia infection affecting pregnancy in first trimester   • Multigravida in second trimester   • Other constipation   • Pregnancy       Start Time: 1502  Stop Time: 1713

## 2019-09-22 ENCOUNTER — ANESTHESIA (OUTPATIENT)
Dept: LABOR AND DELIVERY | Facility: HOSPITAL | Age: 19
End: 2019-09-22

## 2019-09-22 ENCOUNTER — ANESTHESIA EVENT (OUTPATIENT)
Dept: LABOR AND DELIVERY | Facility: HOSPITAL | Age: 19
End: 2019-09-22

## 2019-09-22 ENCOUNTER — HOSPITAL ENCOUNTER (INPATIENT)
Facility: HOSPITAL | Age: 19
LOS: 2 days | Discharge: HOME | End: 2019-09-24
Attending: OBSTETRICS & GYNECOLOGY | Admitting: OBSTETRICS & GYNECOLOGY

## 2019-09-22 LAB
ABO GROUP BLD: NORMAL
ABO GROUP BLD: NORMAL
AMPHET+METHAMPHET UR QL: NEGATIVE
BARBITURATES UR QL SCN: NEGATIVE
BASOPHILS # BLD AUTO: 0.08 10*3/MM3 (ref 0–0.2)
BASOPHILS NFR BLD AUTO: 0.5 % (ref 0–1.5)
BENZODIAZ UR QL SCN: NEGATIVE
BLD GP AB SCN SERPL QL: POSITIVE
CANNABINOIDS SERPL QL: NEGATIVE
COCAINE UR QL: NEGATIVE
DEPRECATED RDW RBC AUTO: 45 FL (ref 37–54)
EOSINOPHIL # BLD AUTO: 0.07 10*3/MM3 (ref 0–0.4)
EOSINOPHIL NFR BLD AUTO: 0.5 % (ref 0.3–6.2)
ERYTHROCYTE [DISTWIDTH] IN BLOOD BY AUTOMATED COUNT: 13.6 % (ref 12.3–15.4)
HCT VFR BLD AUTO: 40.7 % (ref 34–46.6)
HGB BLD-MCNC: 13.7 G/DL (ref 12–15.9)
IMM GRANULOCYTES # BLD AUTO: 0.12 10*3/MM3 (ref 0–0.05)
IMM GRANULOCYTES NFR BLD AUTO: 0.8 % (ref 0–0.5)
LYMPHOCYTES # BLD AUTO: 2.8 10*3/MM3 (ref 0.7–3.1)
LYMPHOCYTES NFR BLD AUTO: 18.1 % (ref 19.6–45.3)
MCH RBC QN AUTO: 30.4 PG (ref 26.6–33)
MCHC RBC AUTO-ENTMCNC: 33.7 G/DL (ref 31.5–35.7)
MCV RBC AUTO: 90.2 FL (ref 79–97)
METHADONE UR QL SCN: NEGATIVE
MONOCYTES # BLD AUTO: 0.97 10*3/MM3 (ref 0.1–0.9)
MONOCYTES NFR BLD AUTO: 6.3 % (ref 5–12)
NEUTROPHILS # BLD AUTO: 11.46 10*3/MM3 (ref 1.7–7)
NEUTROPHILS NFR BLD AUTO: 73.8 % (ref 42.7–76)
NRBC BLD AUTO-RTO: 0 /100 WBC (ref 0–0.2)
OPIATES UR QL: NEGATIVE
OXYCODONE UR QL SCN: NEGATIVE
PLATELET # BLD AUTO: 282 10*3/MM3 (ref 140–450)
PMV BLD AUTO: 8.9 FL (ref 6–12)
RBC # BLD AUTO: 4.51 10*6/MM3 (ref 3.77–5.28)
RESIDUAL RHIG DETECTED: NORMAL
RH BLD: NEGATIVE
RH BLD: NEGATIVE
T&S EXPIRATION DATE: NORMAL
WBC NRBC COR # BLD: 15.5 10*3/MM3 (ref 3.4–10.8)

## 2019-09-22 PROCEDURE — 80307 DRUG TEST PRSMV CHEM ANLYZR: CPT | Performed by: OBSTETRICS & GYNECOLOGY

## 2019-09-22 PROCEDURE — 86901 BLOOD TYPING SEROLOGIC RH(D): CPT | Performed by: OBSTETRICS & GYNECOLOGY

## 2019-09-22 PROCEDURE — 86900 BLOOD TYPING SEROLOGIC ABO: CPT | Performed by: OBSTETRICS & GYNECOLOGY

## 2019-09-22 PROCEDURE — C1755 CATHETER, INTRASPINAL: HCPCS | Performed by: ANESTHESIOLOGY

## 2019-09-22 PROCEDURE — 86870 RBC ANTIBODY IDENTIFICATION: CPT | Performed by: OBSTETRICS & GYNECOLOGY

## 2019-09-22 PROCEDURE — 86850 RBC ANTIBODY SCREEN: CPT | Performed by: OBSTETRICS & GYNECOLOGY

## 2019-09-22 PROCEDURE — C1755 CATHETER, INTRASPINAL: HCPCS

## 2019-09-22 PROCEDURE — 59410 OBSTETRICAL CARE: CPT | Performed by: OBSTETRICS & GYNECOLOGY

## 2019-09-22 PROCEDURE — 85025 COMPLETE CBC W/AUTO DIFF WBC: CPT | Performed by: OBSTETRICS & GYNECOLOGY

## 2019-09-22 RX ORDER — BISACODYL 10 MG
10 SUPPOSITORY, RECTAL RECTAL DAILY PRN
Status: DISCONTINUED | OUTPATIENT
Start: 2019-09-23 | End: 2019-09-24 | Stop reason: HOSPADM

## 2019-09-22 RX ORDER — OXYTOCIN-SODIUM CHLORIDE 0.9% IV SOLN 30 UNIT/500ML 30-0.9/5 UT/ML-%
999 SOLUTION INTRAVENOUS ONCE
Status: COMPLETED | OUTPATIENT
Start: 2019-09-22 | End: 2019-09-22

## 2019-09-22 RX ORDER — SODIUM CHLORIDE, SODIUM LACTATE, POTASSIUM CHLORIDE, CALCIUM CHLORIDE 600; 310; 30; 20 MG/100ML; MG/100ML; MG/100ML; MG/100ML
125 INJECTION, SOLUTION INTRAVENOUS CONTINUOUS
Status: DISCONTINUED | OUTPATIENT
Start: 2019-09-22 | End: 2019-09-22

## 2019-09-22 RX ORDER — LIDOCAINE HYDROCHLORIDE 10 MG/ML
5 INJECTION, SOLUTION EPIDURAL; INFILTRATION; INTRACAUDAL; PERINEURAL AS NEEDED
Status: DISCONTINUED | OUTPATIENT
Start: 2019-09-22 | End: 2019-09-22 | Stop reason: HOSPADM

## 2019-09-22 RX ORDER — OXYTOCIN-SODIUM CHLORIDE 0.9% IV SOLN 30 UNIT/500ML 30-0.9/5 UT/ML-%
125 SOLUTION INTRAVENOUS CONTINUOUS PRN
Status: DISCONTINUED | OUTPATIENT
Start: 2019-09-22 | End: 2019-09-22 | Stop reason: HOSPADM

## 2019-09-22 RX ORDER — ZOLPIDEM TARTRATE 5 MG/1
5 TABLET ORAL NIGHTLY PRN
Status: DISCONTINUED | OUTPATIENT
Start: 2019-09-22 | End: 2019-09-24 | Stop reason: HOSPADM

## 2019-09-22 RX ORDER — PRENATAL VIT NO.126/IRON/FOLIC 28MG-0.8MG
1 TABLET ORAL DAILY
Status: DISCONTINUED | OUTPATIENT
Start: 2019-09-22 | End: 2019-09-24 | Stop reason: HOSPADM

## 2019-09-22 RX ORDER — SODIUM CHLORIDE 0.9 % (FLUSH) 0.9 %
10 SYRINGE (ML) INJECTION AS NEEDED
Status: DISCONTINUED | OUTPATIENT
Start: 2019-09-22 | End: 2019-09-22 | Stop reason: HOSPADM

## 2019-09-22 RX ORDER — EPHEDRINE SULFATE 50 MG/ML
10 INJECTION, SOLUTION INTRAVENOUS
Status: DISCONTINUED | OUTPATIENT
Start: 2019-09-22 | End: 2019-09-22 | Stop reason: HOSPADM

## 2019-09-22 RX ORDER — DOCUSATE SODIUM 100 MG/1
100 CAPSULE, LIQUID FILLED ORAL 2 TIMES DAILY
Status: DISCONTINUED | OUTPATIENT
Start: 2019-09-22 | End: 2019-09-24 | Stop reason: HOSPADM

## 2019-09-22 RX ORDER — MISOPROSTOL 200 UG/1
600 TABLET ORAL ONCE
Status: COMPLETED | OUTPATIENT
Start: 2019-09-22 | End: 2019-09-22

## 2019-09-22 RX ORDER — SODIUM CHLORIDE 0.9 % (FLUSH) 0.9 %
1-10 SYRINGE (ML) INJECTION AS NEEDED
Status: DISCONTINUED | OUTPATIENT
Start: 2019-09-22 | End: 2019-09-24 | Stop reason: HOSPADM

## 2019-09-22 RX ORDER — LIDOCAINE HYDROCHLORIDE AND EPINEPHRINE 15; 5 MG/ML; UG/ML
INJECTION, SOLUTION EPIDURAL AS NEEDED
Status: DISCONTINUED | OUTPATIENT
Start: 2019-09-22 | End: 2019-09-22 | Stop reason: SURG

## 2019-09-22 RX ORDER — IBUPROFEN 600 MG/1
600 TABLET ORAL EVERY 8 HOURS PRN
Status: DISCONTINUED | OUTPATIENT
Start: 2019-09-22 | End: 2019-09-24 | Stop reason: HOSPADM

## 2019-09-22 RX ORDER — CALCIUM CARBONATE 200(500)MG
2 TABLET,CHEWABLE ORAL 3 TIMES DAILY PRN
Status: DISCONTINUED | OUTPATIENT
Start: 2019-09-22 | End: 2019-09-24 | Stop reason: HOSPADM

## 2019-09-22 RX ORDER — MISOPROSTOL 200 UG/1
800 TABLET ORAL AS NEEDED
Status: DISCONTINUED | OUTPATIENT
Start: 2019-09-22 | End: 2019-09-22 | Stop reason: HOSPADM

## 2019-09-22 RX ORDER — HYDROCODONE BITARTRATE AND ACETAMINOPHEN 5; 325 MG/1; MG/1
1 TABLET ORAL EVERY 4 HOURS PRN
Status: DISCONTINUED | OUTPATIENT
Start: 2019-09-22 | End: 2019-09-24 | Stop reason: HOSPADM

## 2019-09-22 RX ORDER — CARBOPROST TROMETHAMINE 250 UG/ML
250 INJECTION, SOLUTION INTRAMUSCULAR AS NEEDED
Status: DISCONTINUED | OUTPATIENT
Start: 2019-09-22 | End: 2019-09-22 | Stop reason: HOSPADM

## 2019-09-22 RX ORDER — SODIUM CHLORIDE 0.9 % (FLUSH) 0.9 %
3 SYRINGE (ML) INJECTION EVERY 12 HOURS SCHEDULED
Status: DISCONTINUED | OUTPATIENT
Start: 2019-09-22 | End: 2019-09-22 | Stop reason: HOSPADM

## 2019-09-22 RX ORDER — METHYLERGONOVINE MALEATE 0.2 MG/ML
200 INJECTION INTRAVENOUS ONCE AS NEEDED
Status: DISCONTINUED | OUTPATIENT
Start: 2019-09-22 | End: 2019-09-22 | Stop reason: HOSPADM

## 2019-09-22 RX ORDER — OXYTOCIN-SODIUM CHLORIDE 0.9% IV SOLN 30 UNIT/500ML 30-0.9/5 UT/ML-%
250 SOLUTION INTRAVENOUS CONTINUOUS
Status: ACTIVE | OUTPATIENT
Start: 2019-09-22 | End: 2019-09-22

## 2019-09-22 RX ORDER — OXYTOCIN-SODIUM CHLORIDE 0.9% IV SOLN 30 UNIT/500ML 30-0.9/5 UT/ML-%
SOLUTION INTRAVENOUS
Status: DISPENSED
Start: 2019-09-22 | End: 2019-09-23

## 2019-09-22 RX ADMIN — SODIUM CHLORIDE, POTASSIUM CHLORIDE, SODIUM LACTATE AND CALCIUM CHLORIDE 1000 ML: 600; 310; 30; 20 INJECTION, SOLUTION INTRAVENOUS at 14:25

## 2019-09-22 RX ADMIN — Medication 1 TABLET: at 20:06

## 2019-09-22 RX ADMIN — LIDOCAINE HYDROCHLORIDE AND EPINEPHRINE 5 ML: 15; 5 INJECTION, SOLUTION EPIDURAL at 14:40

## 2019-09-22 RX ADMIN — IBUPROFEN 600 MG: 600 TABLET, FILM COATED ORAL at 20:06

## 2019-09-22 RX ADMIN — MISOPROSTOL 600 MCG: 200 TABLET ORAL at 20:06

## 2019-09-22 RX ADMIN — OXYTOCIN 999 ML/HR: 10 INJECTION INTRAVENOUS at 15:53

## 2019-09-22 RX ADMIN — DOCUSATE SODIUM 100 MG: 100 CAPSULE, LIQUID FILLED ORAL at 20:06

## 2019-09-22 RX ADMIN — Medication 8 ML/HR: at 14:50

## 2019-09-22 NOTE — ANESTHESIA PROCEDURE NOTES
Labor Epidural      Patient reassessed immediately prior to procedure    Patient location during procedure: OB  Start Time: 9/22/2019 2:32 PM  Stop Time: 9/22/2019 2:55 PM  Performed By  Anesthesiologist: Aj Carey MD  Preanesthetic Checklist  Completed: patient identified, site marked, surgical consent, pre-op evaluation, timeout performed, IV checked, risks and benefits discussed and monitors and equipment checked  Prep:  Pt Position:sitting  Sterile Tech:cap, gloves, mask and sterile barrier  Prep:povidone-iodine 7.5% surgical scrub  Monitoring:blood pressure monitoring, continuous pulse oximetry and EKG  Epidural Block Procedure:  Approach:midline  Guidance:landmark technique and palpation technique  Location:L4-L5  Needle Type:Tuohy  Needle Gauge:17  Loss of Resistance Medium: air  Paresthesia: none  Aspiration:negative  Test Dose:negative  Number of Attempts: 1  Post Assessment:  Dressing:occlusive dressing applied and secured with tape  Pt Tolerance:patient tolerated the procedure well with no apparent complications  Complications:no

## 2019-09-22 NOTE — ANESTHESIA PREPROCEDURE EVALUATION
Anesthesia Evaluation     Patient summary reviewed and Nursing notes reviewed   NPO Solid Status: > 8 hours  NPO Liquid Status: > 8 hours           Airway   Mallampati: II  Neck ROM: full  No difficulty expected  Dental - normal exam     Pulmonary     breath sounds clear to auscultation  (+) a smoker Current,   Cardiovascular     Rhythm: regular        Neuro/Psych  GI/Hepatic/Renal/Endo      Musculoskeletal     Abdominal    Substance History      OB/GYN    (+) Pregnant (37 wks, 3days),         Other                      Anesthesia Plan    ASA 2     epidural     Anesthetic plan, all risks, benefits, and alternatives have been provided, discussed and informed consent has been obtained with: patient.

## 2019-09-23 LAB
BASOPHILS # BLD AUTO: 0.11 10*3/MM3 (ref 0–0.2)
BASOPHILS NFR BLD AUTO: 0.6 % (ref 0–1.5)
DEPRECATED RDW RBC AUTO: 42.2 FL (ref 37–54)
EOSINOPHIL # BLD AUTO: 0.23 10*3/MM3 (ref 0–0.4)
EOSINOPHIL NFR BLD AUTO: 1.2 % (ref 0.3–6.2)
ERYTHROCYTE [DISTWIDTH] IN BLOOD BY AUTOMATED COUNT: 12.8 % (ref 12.3–15.4)
HCT VFR BLD AUTO: 37.1 % (ref 34–46.6)
HGB BLD-MCNC: 12.7 G/DL (ref 12–15.9)
IMM GRANULOCYTES # BLD AUTO: 0.16 10*3/MM3 (ref 0–0.05)
IMM GRANULOCYTES NFR BLD AUTO: 0.8 % (ref 0–0.5)
LYMPHOCYTES # BLD AUTO: 4.4 10*3/MM3 (ref 0.7–3.1)
LYMPHOCYTES NFR BLD AUTO: 22.3 % (ref 19.6–45.3)
MCH RBC QN AUTO: 31 PG (ref 26.6–33)
MCHC RBC AUTO-ENTMCNC: 34.2 G/DL (ref 31.5–35.7)
MCV RBC AUTO: 90.5 FL (ref 79–97)
MONOCYTES # BLD AUTO: 1.64 10*3/MM3 (ref 0.1–0.9)
MONOCYTES NFR BLD AUTO: 8.3 % (ref 5–12)
NEUTROPHILS # BLD AUTO: 13.17 10*3/MM3 (ref 1.7–7)
NEUTROPHILS NFR BLD AUTO: 66.8 % (ref 42.7–76)
NRBC BLD AUTO-RTO: 0 /100 WBC (ref 0–0.2)
PLATELET # BLD AUTO: 251 10*3/MM3 (ref 140–450)
PMV BLD AUTO: 9.2 FL (ref 6–12)
RBC # BLD AUTO: 4.1 10*6/MM3 (ref 3.77–5.28)
WBC NRBC COR # BLD: 19.71 10*3/MM3 (ref 3.4–10.8)

## 2019-09-23 PROCEDURE — 99024 POSTOP FOLLOW-UP VISIT: CPT | Performed by: OBSTETRICS & GYNECOLOGY

## 2019-09-23 PROCEDURE — 85025 COMPLETE CBC W/AUTO DIFF WBC: CPT | Performed by: OBSTETRICS & GYNECOLOGY

## 2019-09-23 RX ADMIN — IBUPROFEN 600 MG: 600 TABLET, FILM COATED ORAL at 08:13

## 2019-09-23 RX ADMIN — DOCUSATE SODIUM 100 MG: 100 CAPSULE, LIQUID FILLED ORAL at 08:13

## 2019-09-23 RX ADMIN — IBUPROFEN 600 MG: 600 TABLET, FILM COATED ORAL at 16:40

## 2019-09-23 RX ADMIN — Medication 1 TABLET: at 08:13

## 2019-09-24 VITALS
OXYGEN SATURATION: 100 % | RESPIRATION RATE: 20 BRPM | HEART RATE: 66 BPM | TEMPERATURE: 97.8 F | SYSTOLIC BLOOD PRESSURE: 105 MMHG | DIASTOLIC BLOOD PRESSURE: 68 MMHG

## 2019-09-24 LAB — BUPRENORPHINE UR QL: NEGATIVE NG/ML

## 2019-09-24 PROCEDURE — 90707 MMR VACCINE SC: CPT | Performed by: OBSTETRICS & GYNECOLOGY

## 2019-09-24 PROCEDURE — 99024 POSTOP FOLLOW-UP VISIT: CPT | Performed by: OBSTETRICS & GYNECOLOGY

## 2019-09-24 PROCEDURE — 25010000002 MEASLES, MUMPS & RUBELLA VAC PER 0.5 ML: Performed by: OBSTETRICS & GYNECOLOGY

## 2019-09-24 PROCEDURE — 90471 IMMUNIZATION ADMIN: CPT | Performed by: OBSTETRICS & GYNECOLOGY

## 2019-09-24 RX ORDER — IBUPROFEN 600 MG/1
600 TABLET ORAL EVERY 8 HOURS PRN
Qty: 30 TABLET | Refills: 0 | Status: SHIPPED | OUTPATIENT
Start: 2019-09-24 | End: 2019-11-06

## 2019-09-24 RX ORDER — HYDROCODONE BITARTRATE AND ACETAMINOPHEN 5; 325 MG/1; MG/1
1 TABLET ORAL EVERY 4 HOURS PRN
Qty: 10 TABLET | Refills: 0 | Status: SHIPPED | OUTPATIENT
Start: 2019-09-24 | End: 2019-10-02

## 2019-09-24 RX ADMIN — Medication 1 TABLET: at 08:19

## 2019-09-24 RX ADMIN — MEASLES, MUMPS, AND RUBELLA VIRUS VACCINE LIVE 0.5 ML: 1000; 12500; 1000 INJECTION, POWDER, LYOPHILIZED, FOR SUSPENSION SUBCUTANEOUS at 10:31

## 2019-09-24 RX ADMIN — IBUPROFEN 600 MG: 600 TABLET, FILM COATED ORAL at 08:19

## 2019-11-06 ENCOUNTER — POSTPARTUM VISIT (OUTPATIENT)
Dept: OBSTETRICS AND GYNECOLOGY | Facility: CLINIC | Age: 19
End: 2019-11-06

## 2019-11-06 VITALS
DIASTOLIC BLOOD PRESSURE: 68 MMHG | HEART RATE: 97 BPM | WEIGHT: 151 LBS | SYSTOLIC BLOOD PRESSURE: 98 MMHG | BODY MASS INDEX: 25.16 KG/M2 | HEIGHT: 65 IN

## 2019-11-06 PROCEDURE — 0503F POSTPARTUM CARE VISIT: CPT | Performed by: OBSTETRICS & GYNECOLOGY

## 2019-11-06 RX ORDER — NORGESTIMATE AND ETHINYL ESTRADIOL 0.25-0.035
1 KIT ORAL DAILY
Qty: 84 TABLET | Refills: 4 | Status: SHIPPED | OUTPATIENT
Start: 2019-11-06

## 2019-11-06 NOTE — PROGRESS NOTES
"Chief Complaint   Patient presents with   • Postpartum Care     vaginal DD 19, pt desires oracl contraceptions last pap: 19        SUBJECTIVE:   Lara Tijerina is a 19 y.o.  who presents s/p  Spontaneous Vaginal Delivery on 19.  Reports 2 weeks after delivery the FOB passed away from [facility].  She is staying at his mom's house who is supporting her and the baby.  Plans to get counseling.  Reports difficulty sleeping because she sees the FOB \"everytime I close my eyes.\"  She also feels tearful.  No SI/HI.    Has not been on medication in the past for depression/anxiety.    Bowel and bladder function have returned to normal  Reports she is on her period right now.  Has not been sexually active since delivery.    Desires oral contraceptive pill - was on pill in the past and did well.  Denies VTE/migraine with aura, family h/o clotting disorder.      Past Medical History:   Diagnosis Date   • Herpes      No past surgical history on file.  Social History     Tobacco Use   • Smoking status: Current Every Day Smoker     Packs/day: 1.00     Types: Cigarettes   • Smokeless tobacco: Never Used   Substance Use Topics   • Alcohol use: No     Frequency: Never   • Drug use: No     No family history on file.    Patient Active Problem List   Diagnosis   • Tobacco smoking affecting pregnancy in first trimester   • Nausea and vomiting during pregnancy   • History of herpes genitalis   • Chlamydia infection affecting pregnancy in first trimester   • Multigravida in second trimester   • Other constipation   • Pregnancy        OBJECTIVE:   BP 98/68   Pulse 97   Ht 165.1 cm (65\")   Wt 68.5 kg (151 lb)   Breastfeeding? No   BMI 25.13 kg/m²    Physical Examination:   General appearance - alert, well appearing, and in no distress  Breasts - declined  Chest - no tachypnea, retractions or cyanosis  Heart - normal rate and regular rhythm  Abdomen - soft, nontender, nondistended, no masses or organomegaly;   Pelvic - small " papilloma on right mons (approx 1 cm), vulva, vagina, cervix, uterus and adnexa, some dark red blood in vaginal vault  Neurological - screening mental status exam normal  Musculoskeletal - no joint tenderness, deformity or swelling  Psychiatric - normal mood and affect    Lab Results   Component Value Date    WBC 19.71 (H) 2019    HGB 12.7 2019    HCT 37.1 2019    MCV 90.5 2019     2019        ASSESSMENT:   S/p   Postpartum depression    PLAN:   Doing overall well postpartum  Baby doing well  Contraception: desires oral contraceptive pill for now but considering Nexplanon  At this time, may resume normal activity including intercourse and lifting.  Reviewed normal precautions.  Reviewed last pap smear 2019 NIL - pt would like to have papilloma removed eventually but as it has decreased in size since delivery would like to wait a couple months prior to removal  PP depression, situational depression: denies SI/HI. Counseled on importance of immediate follow up if worsening depression, SI/HI were to arise. Start zoloft and counseling. Follow up within month for recheck.  Recommended follow up for annual exam or sooner with problems

## 2019-11-07 ENCOUNTER — TELEPHONE (OUTPATIENT)
Dept: OBSTETRICS AND GYNECOLOGY | Facility: CLINIC | Age: 19
End: 2019-11-07

## 2019-11-07 NOTE — TELEPHONE ENCOUNTER
L/m for pt to call regarding referral to Psychology.    Please inform pt, she may call the back of her insurance card to see what provider accepts her insurance for psychology services.  Or she may call [facility] at [phone], or [facility] at [phone] to schedule an appointment.     Pt # [phone]

## 2019-11-25 ENCOUNTER — TELEPHONE (OUTPATIENT)
Dept: OBSTETRICS AND GYNECOLOGY | Facility: CLINIC | Age: 19
End: 2019-11-25

## 2019-11-25 NOTE — TELEPHONE ENCOUNTER
"Called pt and she stated since yesterday she noticed a red hard bump on the right \"butt cheek\". She states when touching the bump she feels a heat to it and it hurts when sitting down. Pt states she thinks this may be a boil. Pt denies a fever or itching in the area.   "

## 2019-11-25 NOTE — TELEPHONE ENCOUNTER
Patient may be added onto office schedule in next 1-2 days. If area is rapidly enlarging or if she has a temp 100.4 or greater, go to ER.  In meantime, recommend warm compress/warm bath.

## 2019-11-25 NOTE — TELEPHONE ENCOUNTER
Pt is aware going to the ER for enlarging of the bump or temp higher than 100.4 or greater. Pt has been scheduled for 11/27/19 at 10:30am. Pt agrees on time and location.

## 2019-11-25 NOTE — TELEPHONE ENCOUNTER
Patient would like to speak with you about, would not give me any info other than it was about medicine and important

## 2019-12-03 ENCOUNTER — POSTPARTUM VISIT (OUTPATIENT)
Dept: OBSTETRICS AND GYNECOLOGY | Facility: CLINIC | Age: 19
End: 2019-12-03

## 2019-12-03 VITALS
BODY MASS INDEX: 24.16 KG/M2 | WEIGHT: 145 LBS | HEIGHT: 65 IN | SYSTOLIC BLOOD PRESSURE: 105 MMHG | HEART RATE: 85 BPM | DIASTOLIC BLOOD PRESSURE: 65 MMHG

## 2019-12-03 PROCEDURE — 0503F POSTPARTUM CARE VISIT: CPT | Performed by: OBSTETRICS & GYNECOLOGY

## 2019-12-03 RX ORDER — CLINDAMYCIN PHOSPHATE 10 MG/G
GEL TOPICAL 2 TIMES DAILY
Qty: 30 G | Refills: 0 | Status: SHIPPED | OUTPATIENT
Start: 2019-12-03

## 2019-12-03 RX ORDER — CEPHALEXIN 500 MG/1
500 CAPSULE ORAL 4 TIMES DAILY
Qty: 40 CAPSULE | Refills: 0 | Status: SHIPPED | OUTPATIENT
Start: 2019-12-03 | End: 2019-12-13
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
## 2019-12-20 ENCOUNTER — TELEPHONE (OUTPATIENT)
Dept: OBSTETRICS AND GYNECOLOGY | Facility: CLINIC | Age: 19
End: 2019-12-20

## 2020-04-08 ENCOUNTER — TELEPHONE (OUTPATIENT)
Dept: OBSTETRICS AND GYNECOLOGY | Facility: CLINIC | Age: 20
End: 2020-04-08

## 2020-04-08 RX ORDER — CEPHALEXIN 500 MG/1
500 CAPSULE ORAL 4 TIMES DAILY
Qty: 40 CAPSULE | Refills: 0 | Status: SHIPPED | OUTPATIENT
Start: 2020-04-08 | End: 2020-04-18

## 2020-04-08 NOTE — TELEPHONE ENCOUNTER
Patient calling, had something on her leg last time she was here, she was given antibiotics which she finished.  She said it went away, but came back.  Requesting something stronger.

## 2020-04-08 NOTE — TELEPHONE ENCOUNTER
Rx sent.  Typically these will come and go - there are some general instructions below. Recommend coming for evaluation with fever, chills, worsening symptoms, severe pain, no improvement in the next 2-3 days.  I would recommend using the same antibiotic as it helped last time.        Follow these instructions at home:  Medicines    · Take over-the-counter and prescription medicines only as told by your health care provider.  · If you were prescribed an antibiotic medicine, take it as told by your health care provider. Do not stop taking the antibiotic even if you start to feel better.  Abscess care    · If you have an abscess that has not drained, apply heat to the affected area. Use the heat source that your health care provider recommends, such as a moist heat pack or a heating pad.  ? Place a towel between your skin and the heat source.  ? Leave the heat on for 20-30 minutes.  ? Remove the heat if your skin turns bright red. This is especially important if you are unable to feel pain, heat, or cold. You may have a greater risk of getting burned.  · Follow instructions from your health care provider about how to take care of your abscess. Make sure you:  ? Cover the abscess with a bandage (dressing).  ? Change your dressing or gauze as told by your health care provider.  ? Wash your hands with soap and water before you change the dressing or gauze. If soap and water are not available, use hand .  · Check your abscess every day for signs of a worsening infection. Check for:  ? More redness, swelling, or pain.  ? More fluid or blood.  ? Warmth.  ? More pus or a bad smell.  General instructions  · To avoid spreading the infection:  ? Do not share personal care items, towels, or hot tubs with others.  ? Avoid making skin contact with other people.  · Keep all follow-up visits as told by your health care provider. This is important.  Contact a health care provider if you have:  · More redness, swelling, or  pain around your abscess.  · More fluid or blood coming from your abscess.  · Warm skin around your abscess.  · More pus or a bad smell coming from your abscess.  · A fever.  · Muscle aches.  · Chills or a general ill feeling.  Get help right away if you:  · Have severe pain.  · See red streaks on your skin spreading away from the abscess.

## 2020-04-08 NOTE — PATIENT INSTRUCTIONS
Skin Abscess    A skin abscess is an infected area on or under your skin that contains a collection of pus and other material. An abscess may also be called a furuncle, carbuncle, or boil. An abscess can occur in or on almost any part of your body.  Some abscesses break open (rupture) on their own. Most continue to get worse unless they are treated. The infection can spread deeper into the body and eventually into your blood, which can make you feel ill. Treatment usually involves draining the abscess.  What are the causes?  An abscess occurs when germs, like bacteria, pass through your skin and cause an infection. This may be caused by:  · A scrape or cut on your skin.  · A puncture wound through your skin, including a needle injection or insect bite.  · Blocked oil or sweat glands.  · Blocked and infected hair follicles.  · A cyst that forms beneath your skin (sebaceous cyst) and becomes infected.  What increases the risk?  This condition is more likely to develop in people who:  · Have a weak body defense system (immune system).  · Have diabetes.  · Have dry and irritated skin.  · Get frequent injections or use illegal IV drugs.  · Have a foreign body in a wound, such as a splinter.  · Have problems with their lymph system or veins.  What are the signs or symptoms?  Symptoms of this condition include:  · A painful, firm bump under the skin.  · A bump with pus at the top. This may break through the skin and drain.  Other symptoms include:  · Redness surrounding the abscess site.  · Warmth.  · Swelling of the lymph nodes (glands) near the abscess.  · Tenderness.  · A sore on the skin.  How is this diagnosed?  This condition may be diagnosed based on:  · A physical exam.  · Your medical history.  · A sample of pus. This may be used to find out what is causing the infection.  · Blood tests.  · Imaging tests, such as an ultrasound, CT scan, or MRI.  How is this treated?  A small abscess that drains on its own may not  need treatment. Treatment for larger abscesses may include:  · Moist heat or heat pack applied to the area several times a day.  · A procedure to drain the abscess (incision and drainage).  · Antibiotic medicines. For a severe abscess, you may first get antibiotics through an IV and then change to antibiotics by mouth.  Follow these instructions at home:  Medicines    · Take over-the-counter and prescription medicines only as told by your health care provider.  · If you were prescribed an antibiotic medicine, take it as told by your health care provider. Do not stop taking the antibiotic even if you start to feel better.  Abscess care    · If you have an abscess that has not drained, apply heat to the affected area. Use the heat source that your health care provider recommends, such as a moist heat pack or a heating pad.  ? Place a towel between your skin and the heat source.  ? Leave the heat on for 20-30 minutes.  ? Remove the heat if your skin turns bright red. This is especially important if you are unable to feel pain, heat, or cold. You may have a greater risk of getting burned.  · Follow instructions from your health care provider about how to take care of your abscess. Make sure you:  ? Cover the abscess with a bandage (dressing).  ? Change your dressing or gauze as told by your health care provider.  ? Wash your hands with soap and water before you change the dressing or gauze. If soap and water are not available, use hand .  · Check your abscess every day for signs of a worsening infection. Check for:  ? More redness, swelling, or pain.  ? More fluid or blood.  ? Warmth.  ? More pus or a bad smell.  General instructions  · To avoid spreading the infection:  ? Do not share personal care items, towels, or hot tubs with others.  ? Avoid making skin contact with other people.  · Keep all follow-up visits as told by your health care provider. This is important.  Contact a health care provider if you  have:  · More redness, swelling, or pain around your abscess.  · More fluid or blood coming from your abscess.  · Warm skin around your abscess.  · More pus or a bad smell coming from your abscess.  · A fever.  · Muscle aches.  · Chills or a general ill feeling.  Get help right away if you:  · Have severe pain.  · See red streaks on your skin spreading away from the abscess.  Summary  · A skin abscess is an infected area on or under your skin that contains a collection of pus and other material.  · A small abscess that drains on its own may not need treatment.  · Treatment for larger abscesses may include having a procedure to drain the abscess and taking an antibiotic.  This information is not intended to replace advice given to you by your health care provider. Make sure you discuss any questions you have with your health care provider.  Document Released: 09/27/2006 Document Revised: 01/31/2019 Document Reviewed: 01/31/2019  [employer] Interactive Patient Education © 2020 [employer]

## 2021-04-29 ENCOUNTER — TELEPHONE (OUTPATIENT)
Dept: OBSTETRICS AND GYNECOLOGY | Facility: CLINIC | Age: 21
End: 2021-04-29

## 2023-04-11 ENCOUNTER — TELEPHONE (OUTPATIENT)
Dept: OBSTETRICS AND GYNECOLOGY | Facility: CLINIC | Age: 23
End: 2023-04-11

## 2023-04-11 NOTE — TELEPHONE ENCOUNTER
Attempted to reach pt re: appt w/Britney 4/14/23 @ 2:15pm. Due to a change in Britney's schedule, we are needing to adjust the time of her appt. Britney could see her @ 12:30pm. LM for pt to call back. If she is not able to do the available time listed, she will need to r/s her appt.

## 2023-04-18 ENCOUNTER — TELEPHONE (OUTPATIENT)
Dept: OBSTETRICS AND GYNECOLOGY | Facility: CLINIC | Age: 23
End: 2023-04-18

## 2023-09-08 ENCOUNTER — APPOINTMENT (OUTPATIENT)
Dept: ULTRASOUND IMAGING | Facility: HOSPITAL | Age: 23
End: 2023-09-08
Payer: COMMERCIAL

## 2023-09-08 ENCOUNTER — HOSPITAL ENCOUNTER (EMERGENCY)
Facility: HOSPITAL | Age: 23
Discharge: HOME | End: 2023-09-08
Attending: STUDENT IN AN ORGANIZED HEALTH CARE EDUCATION/TRAINING PROGRAM
Payer: COMMERCIAL

## 2023-09-08 VITALS
RESPIRATION RATE: 17 BRPM | SYSTOLIC BLOOD PRESSURE: 129 MMHG | HEART RATE: 100 BPM | WEIGHT: 156 LBS | BODY MASS INDEX: 25.99 KG/M2 | DIASTOLIC BLOOD PRESSURE: 73 MMHG | HEIGHT: 65 IN | OXYGEN SATURATION: 98 % | TEMPERATURE: 98.4 F

## 2023-09-08 DIAGNOSIS — O26.891 RH NEGATIVE STATE IN ANTEPARTUM PERIOD, FIRST TRIMESTER: ICD-10-CM

## 2023-09-08 DIAGNOSIS — O20.9 VAGINAL BLEEDING IN PREGNANCY, FIRST TRIMESTER: ICD-10-CM

## 2023-09-08 DIAGNOSIS — Z67.91 RH NEGATIVE STATE IN ANTEPARTUM PERIOD, FIRST TRIMESTER: ICD-10-CM

## 2023-09-08 DIAGNOSIS — N30.00 ACUTE CYSTITIS WITHOUT HEMATURIA: ICD-10-CM

## 2023-09-08 DIAGNOSIS — Z34.91 FIRST TRIMESTER PREGNANCY: Primary | ICD-10-CM

## 2023-09-08 LAB
ABO GROUP BLD: NORMAL
ALBUMIN SERPL-MCNC: 4.2 G/DL (ref 3.5–5.2)
ALBUMIN/GLOB SERPL: 1.6 G/DL
ALP SERPL-CCNC: 67 U/L (ref 39–117)
ALT SERPL W P-5'-P-CCNC: 11 U/L (ref 1–33)
ANION GAP SERPL CALCULATED.3IONS-SCNC: 10.4 MMOL/L (ref 5–15)
AST SERPL-CCNC: 12 U/L (ref 1–32)
BACTERIA UR QL AUTO: ABNORMAL /HPF
BACTERIA UR QL AUTO: ABNORMAL /HPF
BASOPHILS # BLD AUTO: 0.08 10*3/MM3 (ref 0–0.2)
BASOPHILS NFR BLD AUTO: 0.7 % (ref 0–1.5)
BILIRUB SERPL-MCNC: 0.2 MG/DL (ref 0–1.2)
BILIRUB UR QL STRIP: NEGATIVE
BILIRUB UR QL STRIP: NEGATIVE
BUN SERPL-MCNC: 7 MG/DL (ref 6–20)
BUN/CREAT SERPL: 8.3 (ref 7–25)
CALCIUM SPEC-SCNC: 9.2 MG/DL (ref 8.6–10.5)
CHLORIDE SERPL-SCNC: 105 MMOL/L (ref 98–107)
CLARITY UR: ABNORMAL
CLARITY UR: CLEAR
CLUE CELLS SPEC QL WET PREP: NORMAL
CO2 SERPL-SCNC: 23.6 MMOL/L (ref 22–29)
COLOR UR: YELLOW
COLOR UR: YELLOW
CREAT SERPL-MCNC: 0.84 MG/DL (ref 0.57–1)
DEPRECATED RDW RBC AUTO: 40 FL (ref 37–54)
EGFRCR SERPLBLD CKD-EPI 2021: 99 ML/MIN/1.73
EOSINOPHIL # BLD AUTO: 0.15 10*3/MM3 (ref 0–0.4)
EOSINOPHIL NFR BLD AUTO: 1.4 % (ref 0.3–6.2)
ERYTHROCYTE [DISTWIDTH] IN BLOOD BY AUTOMATED COUNT: 11.9 % (ref 12.3–15.4)
GLOBULIN UR ELPH-MCNC: 2.7 GM/DL
GLUCOSE SERPL-MCNC: 92 MG/DL (ref 65–99)
GLUCOSE UR STRIP-MCNC: NEGATIVE MG/DL
GLUCOSE UR STRIP-MCNC: NEGATIVE MG/DL
HCG INTACT+B SERPL-ACNC: 1626 MIU/ML
HCT VFR BLD AUTO: 43.7 % (ref 34–46.6)
HGB BLD-MCNC: 14.8 G/DL (ref 12–15.9)
HGB UR QL STRIP.AUTO: NEGATIVE
HGB UR QL STRIP.AUTO: NEGATIVE
HOLD SPECIMEN: NORMAL
HOLD SPECIMEN: NORMAL
HYALINE CASTS UR QL AUTO: ABNORMAL /LPF
HYALINE CASTS UR QL AUTO: ABNORMAL /LPF
HYDATID CYST SPEC WET PREP: NORMAL
IMM GRANULOCYTES # BLD AUTO: 0.03 10*3/MM3 (ref 0–0.05)
IMM GRANULOCYTES NFR BLD AUTO: 0.3 % (ref 0–0.5)
KETONES UR QL STRIP: ABNORMAL
KETONES UR QL STRIP: NEGATIVE
LEUKOCYTE ESTERASE UR QL STRIP.AUTO: ABNORMAL
LEUKOCYTE ESTERASE UR QL STRIP.AUTO: ABNORMAL
LIPASE SERPL-CCNC: 19 U/L (ref 13–60)
LYMPHOCYTES # BLD AUTO: 3.21 10*3/MM3 (ref 0.7–3.1)
LYMPHOCYTES NFR BLD AUTO: 29.8 % (ref 19.6–45.3)
MCH RBC QN AUTO: 30.8 PG (ref 26.6–33)
MCHC RBC AUTO-ENTMCNC: 33.9 G/DL (ref 31.5–35.7)
MCV RBC AUTO: 90.9 FL (ref 79–97)
MONOCYTES # BLD AUTO: 0.79 10*3/MM3 (ref 0.1–0.9)
MONOCYTES NFR BLD AUTO: 7.3 % (ref 5–12)
NEUTROPHILS NFR BLD AUTO: 6.51 10*3/MM3 (ref 1.7–7)
NEUTROPHILS NFR BLD AUTO: 60.5 % (ref 42.7–76)
NITRITE UR QL STRIP: NEGATIVE
NITRITE UR QL STRIP: NEGATIVE
NRBC BLD AUTO-RTO: 0 /100 WBC (ref 0–0.2)
PH UR STRIP.AUTO: 6.5 [PH] (ref 5–8)
PH UR STRIP.AUTO: 6.5 [PH] (ref 5–8)
PLATELET # BLD AUTO: 299 10*3/MM3 (ref 140–450)
PMV BLD AUTO: 9.4 FL (ref 6–12)
POTASSIUM SERPL-SCNC: 4.1 MMOL/L (ref 3.5–5.2)
PROT SERPL-MCNC: 6.9 G/DL (ref 6–8.5)
PROT UR QL STRIP: NEGATIVE
PROT UR QL STRIP: NEGATIVE
RBC # BLD AUTO: 4.81 10*6/MM3 (ref 3.77–5.28)
RBC # UR STRIP: ABNORMAL /HPF
RBC # UR STRIP: ABNORMAL /HPF
REF LAB TEST METHOD: ABNORMAL
REF LAB TEST METHOD: ABNORMAL
RH BLD: NEGATIVE
SODIUM SERPL-SCNC: 139 MMOL/L (ref 136–145)
SP GR UR STRIP: 1.01 (ref 1–1.03)
SP GR UR STRIP: <=1.005 (ref 1–1.03)
SQUAMOUS #/AREA URNS HPF: ABNORMAL /HPF
SQUAMOUS #/AREA URNS HPF: ABNORMAL /HPF
T VAGINALIS SPEC QL WET PREP: NORMAL
UROBILINOGEN UR QL STRIP: ABNORMAL
UROBILINOGEN UR QL STRIP: ABNORMAL
WBC # UR STRIP: ABNORMAL /HPF
WBC # UR STRIP: ABNORMAL /HPF
WBC NRBC COR # BLD: 10.77 10*3/MM3 (ref 3.4–10.8)
WBC SPEC QL WET PREP: NORMAL
WHOLE BLOOD HOLD COAG: NORMAL
WHOLE BLOOD HOLD SPECIMEN: NORMAL
YEAST GENITAL QL WET PREP: NORMAL

## 2023-09-08 PROCEDURE — 96372 THER/PROPH/DIAG INJ SC/IM: CPT

## 2023-09-08 PROCEDURE — 85025 COMPLETE CBC W/AUTO DIFF WBC: CPT | Performed by: STUDENT IN AN ORGANIZED HEALTH CARE EDUCATION/TRAINING PROGRAM

## 2023-09-08 PROCEDURE — 87210 SMEAR WET MOUNT SALINE/INK: CPT | Performed by: PHYSICIAN ASSISTANT

## 2023-09-08 PROCEDURE — 83690 ASSAY OF LIPASE: CPT | Performed by: STUDENT IN AN ORGANIZED HEALTH CARE EDUCATION/TRAINING PROGRAM

## 2023-09-08 PROCEDURE — 87086 URINE CULTURE/COLONY COUNT: CPT | Performed by: PHYSICIAN ASSISTANT

## 2023-09-08 PROCEDURE — 80053 COMPREHEN METABOLIC PANEL: CPT | Performed by: STUDENT IN AN ORGANIZED HEALTH CARE EDUCATION/TRAINING PROGRAM

## 2023-09-08 PROCEDURE — 84702 CHORIONIC GONADOTROPIN TEST: CPT | Performed by: STUDENT IN AN ORGANIZED HEALTH CARE EDUCATION/TRAINING PROGRAM

## 2023-09-08 PROCEDURE — 76815 OB US LIMITED FETUS(S): CPT

## 2023-09-08 PROCEDURE — 87591 N.GONORRHOEAE DNA AMP PROB: CPT | Performed by: PHYSICIAN ASSISTANT

## 2023-09-08 PROCEDURE — 86901 BLOOD TYPING SEROLOGIC RH(D): CPT | Performed by: PHYSICIAN ASSISTANT

## 2023-09-08 PROCEDURE — 99284 EMERGENCY DEPT VISIT MOD MDM: CPT

## 2023-09-08 PROCEDURE — 81001 URINALYSIS AUTO W/SCOPE: CPT | Performed by: PHYSICIAN ASSISTANT

## 2023-09-08 PROCEDURE — 25010000002 RHO D IMMUNE GLOBULIN 1500 UNIT/2ML SOLUTION PREFILLED SYRINGE: Performed by: PHYSICIAN ASSISTANT

## 2023-09-08 PROCEDURE — 86900 BLOOD TYPING SEROLOGIC ABO: CPT | Performed by: PHYSICIAN ASSISTANT

## 2023-09-08 PROCEDURE — 76817 TRANSVAGINAL US OBSTETRIC: CPT

## 2023-09-08 PROCEDURE — 81001 URINALYSIS AUTO W/SCOPE: CPT | Performed by: STUDENT IN AN ORGANIZED HEALTH CARE EDUCATION/TRAINING PROGRAM

## 2023-09-08 PROCEDURE — 87491 CHLMYD TRACH DNA AMP PROBE: CPT | Performed by: PHYSICIAN ASSISTANT

## 2023-09-08 RX ORDER — CEPHALEXIN 500 MG/1
500 CAPSULE ORAL 3 TIMES DAILY
Qty: 15 CAPSULE | Refills: 0 | Status: SHIPPED | OUTPATIENT
Start: 2023-09-08 | End: 2023-09-13

## 2023-09-08 RX ORDER — SODIUM CHLORIDE 0.9 % (FLUSH) 0.9 %
10 SYRINGE (ML) INJECTION AS NEEDED
Status: DISCONTINUED | OUTPATIENT
Start: 2023-09-08 | End: 2023-09-08 | Stop reason: HOSPADM

## 2023-09-08 RX ADMIN — HUMAN RHO(D) IMMUNE GLOBULIN 300 MCG: 1500 SOLUTION INTRAMUSCULAR; INTRAVENOUS at 20:26

## 2023-09-08 NOTE — ED PROVIDER NOTES
EMERGENCY DEPARTMENT ENCOUNTER    Room Number:    PCP: Renu Bryson MD  Discussed/ obtained information from independent historians: patient      HPI:  Chief Complaint: vaginal bleeding, pregnant  A complete HPI/ROS/PMH/PSH/SH/FH are unobtainable due to: none  Context: Lara Tijerina is a 23 y.o. female who presents to the ED c/o vaginal bleeding and cramping while pregnant.  She states she has irregular periods, believes last one she had was sometime in July.  She started feeling nauseated a couple days ago and took a test which was positive.  She is unsure how far along she is.  States this morning she awoke with some cramping that felt like menstrual cramping it was intermittently sharp and also had some vaginal spotting 1 time.  She has not had any further bleeding, denies any abnormal vaginal discharge fevers chills diarrhea hematuria dysuria or urinary frequency.  She is G5, P2 Ab2.  Gynecologist is Dr. Verdin.      External (non-ED) record review: Office visit with Dr. Verdin on 12/3/2019 for postpartum care after having a spontaneous vaginal delivery on 2019, postpartum.  Complicated by depression, FOB passed away after delivery from a gunshot wound, had started Zoloft.      PAST MEDICAL HISTORY  Active Ambulatory Problems     Diagnosis Date Noted    Tobacco smoking affecting pregnancy in first trimester 2019    Nausea and vomiting during pregnancy 2019    History of herpes genitalis 2019    Chlamydia infection affecting pregnancy in first trimester 2019    Multigravida in second trimester 2019    Other constipation 04/15/2019    Pregnancy 2019     Resolved Ambulatory Problems     Diagnosis Date Noted    Encounter for  screening for chromosomal anomalies 2019     Past Medical History:   Diagnosis Date    Herpes          PAST SURGICAL HISTORY  History reviewed. No pertinent surgical history.      FAMILY HISTORY  History reviewed. No pertinent family  history.      SOCIAL HISTORY  Social History     Socioeconomic History    Marital status: Single   Tobacco Use    Smoking status: Every Day     Packs/day: 1.00     Types: Cigarettes    Smokeless tobacco: Never   Substance and Sexual Activity    Alcohol use: No    Drug use: No    Sexual activity: Yes         ALLERGIES  Azithromycin        REVIEW OF SYSTEMS  Review of Systems         PHYSICAL EXAM  ED Triage Vitals   Temp Heart Rate Resp BP SpO2   09/08/23 1507 09/08/23 1507 09/08/23 1507 09/08/23 1556 09/08/23 1507   98.4 °F (36.9 °C) 98 16 115/75 98 %      Temp src Heart Rate Source Patient Position BP Location FiO2 (%)   09/08/23 1507 09/08/23 1507 09/08/23 1556 09/08/23 1556 --   Tympanic Monitor Sitting Left arm        Physical Exam      GENERAL: no acute distress  HENT: normocephalic, atraumatic  EYES: no scleral icterus  CV: regular rhythm, normal rate  RESPIRATORY: normal effort CTA B  ABDOMEN: nondistended soft nontender normal bowel sounds no guarding or rigidity  : Chaperoned by JOSH Rush. External genitalia is without lesions  The vaginal vault exhibits no erythema, bleeding, or discharge.  The cervical os is closed.  No cervical motion tenderness, uterine tenderness or adnexal tenderness.  MUSCULOSKELETAL: no deformity  NEURO: alert, moves all extremities, follows commands  PSYCH:  calm, cooperative  SKIN: warm, dry    Vital signs and nursing notes reviewed.          LAB RESULTS  Recent Results (from the past 24 hour(s))   Lipase    Collection Time: 09/08/23  4:11 PM    Specimen: Blood   Result Value Ref Range    Lipase 19 13 - 60 U/L   Comprehensive Metabolic Panel    Collection Time: 09/08/23  4:11 PM    Specimen: Blood   Result Value Ref Range    Glucose 92 65 - 99 mg/dL    BUN 7 6 - 20 mg/dL    Creatinine 0.84 0.57 - 1.00 mg/dL    Sodium 139 136 - 145 mmol/L    Potassium 4.1 3.5 - 5.2 mmol/L    Chloride 105 98 - 107 mmol/L    CO2 23.6 22.0 - 29.0 mmol/L    Calcium 9.2 8.6 - 10.5 mg/dL    Total  Protein 6.9 6.0 - 8.5 g/dL    Albumin 4.2 3.5 - 5.2 g/dL    ALT (SGPT) 11 1 - 33 U/L    AST (SGOT) 12 1 - 32 U/L    Alkaline Phosphatase 67 39 - 117 U/L    Total Bilirubin 0.2 0.0 - 1.2 mg/dL    Globulin 2.7 gm/dL    A/G Ratio 1.6 g/dL    BUN/Creatinine Ratio 8.3 7.0 - 25.0    Anion Gap 10.4 5.0 - 15.0 mmol/L    eGFR 99.0 >60.0 mL/min/1.73   hCG, Quantitative, Pregnancy    Collection Time: 09/08/23  4:11 PM    Specimen: Blood   Result Value Ref Range    HCG Quantitative 1,626.00 mIU/mL   Green Top (Gel)    Collection Time: 09/08/23  4:11 PM   Result Value Ref Range    Extra Tube Hold for add-ons.    Lavender Top    Collection Time: 09/08/23  4:11 PM   Result Value Ref Range    Extra Tube hold for add-on    Gold Top - SST    Collection Time: 09/08/23  4:11 PM   Result Value Ref Range    Extra Tube Hold for add-ons.    Light Blue Top    Collection Time: 09/08/23  4:11 PM   Result Value Ref Range    Extra Tube Hold for add-ons.    CBC Auto Differential    Collection Time: 09/08/23  4:11 PM    Specimen: Blood   Result Value Ref Range    WBC 10.77 3.40 - 10.80 10*3/mm3    RBC 4.81 3.77 - 5.28 10*6/mm3    Hemoglobin 14.8 12.0 - 15.9 g/dL    Hematocrit 43.7 34.0 - 46.6 %    MCV 90.9 79.0 - 97.0 fL    MCH 30.8 26.6 - 33.0 pg    MCHC 33.9 31.5 - 35.7 g/dL    RDW 11.9 (L) 12.3 - 15.4 %    RDW-SD 40.0 37.0 - 54.0 fl    MPV 9.4 6.0 - 12.0 fL    Platelets 299 140 - 450 10*3/mm3    Neutrophil % 60.5 42.7 - 76.0 %    Lymphocyte % 29.8 19.6 - 45.3 %    Monocyte % 7.3 5.0 - 12.0 %    Eosinophil % 1.4 0.3 - 6.2 %    Basophil % 0.7 0.0 - 1.5 %    Immature Grans % 0.3 0.0 - 0.5 %    Neutrophils, Absolute 6.51 1.70 - 7.00 10*3/mm3    Lymphocytes, Absolute 3.21 (H) 0.70 - 3.10 10*3/mm3    Monocytes, Absolute 0.79 0.10 - 0.90 10*3/mm3    Eosinophils, Absolute 0.15 0.00 - 0.40 10*3/mm3    Basophils, Absolute 0.08 0.00 - 0.20 10*3/mm3    Immature Grans, Absolute 0.03 0.00 - 0.05 10*3/mm3    nRBC 0.0 0.0 - 0.2 /100 WBC   Urinalysis With  Microscopic If Indicated (No Culture) - Urine, Clean Catch    Collection Time: 09/08/23  4:14 PM    Specimen: Urine, Clean Catch   Result Value Ref Range    Color, UA Yellow Yellow, Straw    Appearance, UA Cloudy (A) Clear    pH, UA 6.5 5.0 - 8.0    Specific Gravity, UA <=1.005 1.005 - 1.030    Glucose, UA Negative Negative    Ketones, UA Negative Negative    Bilirubin, UA Negative Negative    Blood, UA Negative Negative    Protein, UA Negative Negative    Leuk Esterase, UA Moderate (2+) (A) Negative    Nitrite, UA Negative Negative    Urobilinogen, UA 0.2 E.U./dL 0.2 - 1.0 E.U./dL   Urinalysis, Microscopic Only - Urine, Clean Catch    Collection Time: 09/08/23  4:14 PM    Specimen: Urine, Clean Catch   Result Value Ref Range    RBC, UA 0-2 None Seen, 0-2 /HPF    WBC, UA 31-50 (A) None Seen, 0-2 /HPF    Bacteria, UA 2+ (A) None Seen /HPF    Squamous Epithelial Cells, UA 7-12 (A) None Seen, 0-2 /HPF    Hyaline Casts, UA 3-6 None Seen /LPF    Methodology Automated Microscopy    ABO / Rh    Collection Time: 09/08/23  6:59 PM    Specimen: Blood   Result Value Ref Range    ABO Type A     RH type Negative    Urinalysis With Microscopic If Indicated (No Culture) - Urine, Clean Catch    Collection Time: 09/08/23  7:01 PM    Specimen: Urine, Clean Catch   Result Value Ref Range    Color, UA Yellow Yellow, Straw    Appearance, UA Clear Clear    pH, UA 6.5 5.0 - 8.0    Specific Gravity, UA 1.011 1.005 - 1.030    Glucose, UA Negative Negative    Ketones, UA Trace (A) Negative    Bilirubin, UA Negative Negative    Blood, UA Negative Negative    Protein, UA Negative Negative    Leuk Esterase, UA Small (1+) (A) Negative    Nitrite, UA Negative Negative    Urobilinogen, UA 0.2 E.U./dL 0.2 - 1.0 E.U./dL   Urinalysis, Microscopic Only - Urine, Clean Catch    Collection Time: 09/08/23  7:01 PM    Specimen: Urine, Clean Catch   Result Value Ref Range    RBC, UA 0-2 None Seen, 0-2 /HPF    WBC, UA 13-20 (A) None Seen, 0-2 /HPF     Bacteria, UA 1+ (A) None Seen /HPF    Squamous Epithelial Cells, UA 7-12 (A) None Seen, 0-2 /HPF    Hyaline Casts, UA 0-2 None Seen /LPF    Methodology Automated Microscopy    Wet Prep, Genital - Swab, Vagina    Collection Time: 09/08/23  8:29 PM    Specimen: Vagina; Swab   Result Value Ref Range    YEAST No yeast seen No yeast seen    HYPHAL ELEMENTS No Hyphal elements seen No Hyphal elements seen    WBC'S No WBC's seen No WBC's seen    Clue Cells, Wet Prep No Clue cells seen No Clue cells seen    Trichomonas, Wet Prep No Trichomonas seen No Trichomonas seen       Ordered the above labs and reviewed the results.        RADIOLOGY  US Ob Limited 1 + Fetuses, US Ob Transvaginal    Result Date: 9/8/2023  US OB LIMITED 1 + FETUSES-, US OB TRANSVAGINAL-  INDICATIONS: Pregnancy, vaginal spotting.  TECHNIQUE: TRANSABDOMINAL AND ENDOVAGINAL PELVIC ULTRASOUND  COMPARISON: None available  FINDINGS:  The uterus measures 7.8 x 3.8 x 6.0 cm, with no discrete fibroids identified. A cyst in the endometrial canal may represent a gestational sac or pseudosac, and shows a mean diameter of 3.6 cm, compatible with ultrasound estimated gestational age of 4 weeks, 6 days, if representing a gestational sac. No fetal pole or yolk sac is noted. No extrauterine pregnancy is identified. Close follow-up ultrasound and hCG levels advised. The cervix is closed and appears unremarkable.  The right ovary measures 3.3 x 2.5 x 2.2 cm, and appears unremarkable. The left ovary was not identified, despite persistent attempts, may be obscured by overlying bowel gas.  Minimal cul-de-sac free fluid.       A gestational sac versus pseudosac in the endometrial canal. No extrauterine pregnancy identified. Possibility of ectopic pregnancy is not, however, excluded. Close follow-up ultrasound and hCG levels advised.  Nonvisualization of the left ovary.    This report was finalized on 9/8/2023 8:27 PM by Dr. Alo Pandey M.D.       Ordered the above noted  radiological studies. Reviewed by me in PACS.            PROCEDURES  Procedures              MEDICATIONS GIVEN IN ER  Medications   Rho D Immune Globulin (RHOPHYLAC) injection 300 mcg (300 mcg Intramuscular Given 9/8/23 2026)                   MEDICAL DECISION MAKING, PROGRESS, and CONSULTS    All labs have been independently reviewed by me.  All radiology studies have been reviewed by me and I have also reviewed the radiology report.   EKG's independently viewed and interpreted by me.  Discussion below represents my analysis of pertinent findings related to patient's condition, differential diagnosis, treatment plan and final disposition.            Orders placed during this visit:  Orders Placed This Encounter   Procedures    Chlamydia trachomatis, Neisseria gonorrhoeae, PCR - Swab, Cervix    Wet Prep, Genital - Swab, Vagina    Urine Culture - Urine,    US Ob Limited 1 + Fetuses    US Ob Transvaginal    [place] Draw    Lipase    Comprehensive Metabolic Panel    hCG, Quantitative, Pregnancy    Urinalysis With Microscopic If Indicated (No Culture) - Urine, Clean Catch    CBC Auto Differential    Urinalysis, Microscopic Only - Urine, Clean Catch    Urinalysis With Microscopic If Indicated (No Culture) - Urine, Clean Catch    Urinalysis, Microscopic Only - Urine, Clean Catch    Undress & Gown    Vital Signs    Orthostatic Blood Pressure    Supplies To Bedside - Notify MD When Ready- Pelvic Cart / Set Up    ABO / Rh    CBC & Differential    Green Top (Gel)    Lavender Top    Gold Top - SST    Light Blue Top           Differential diagnosis:  Threatened AB, missed AB, spontaneous AB, ectopic pregnancy, subchorionic hemorrhage, PID, vaginitis      Independent interpretation of labs, radiology studies, and discussions with consultants:  ED Course as of 09/09/23 0058   Fri Sep 08, 2023   2005 Glucose: 92 [KA]   2006 Creatinine: 0.84 [KA]   2006 RH type: Negative [KA]   2006 Glucose: 92 [KA]   2006 Creatinine: 0.84 [KA]    2006 Bacteria, UA(!): 1+ [KA]   2006 I discussed the patient with Kurt ultrasound technician.  Pelvic ultrasound shows IUP at 4 weeks 6 days.  No other suspicious intrapelvic findings. [KA]   2013 Ultrasound interpreted myself:  Thickened endometrial stripe with small anechoic area consistent with gestational sac, no yolk sac visualized. [FS]   2105 YEAST: No yeast seen [KA]   2105 HYPHAL ELEMENTS: No Hyphal elements seen [KA]   2105 WBC'S: No WBC's seen [KA]   2105 Clue Cells, Wet Prep: No Clue cells seen [KA]   2105 Trichomonas, Wet Prep: No Trichomonas seen [KA]   2109 I reassessed the patient, she is resting comfortably.  She is treated with program due to a negative Rh status, I prescribed Keflex for acute cystitis, ultrasound shows early IUP consistent with dates and quantitative hCG.  I recommended close follow-up with her gynecologist and she should call on Monday to touch base with them to see if they like to see her sooner than her initial prenatal appointment.  She is agreeable with this plan and stable for discharge.  Pelvic rest and precautions discussed including strong return precautions.   [KA]      ED Course User Index  [FS] Ortiz Vale MD  [KA] Carmen Joel PA       - Shared decision making: Recommended discharge and patient is agreeable            DIAGNOSIS  Final diagnoses:   First trimester pregnancy   Vaginal bleeding in pregnancy, first trimester   Rh negative state in antepartum period, first trimester   Acute cystitis without hematuria           Follow Up:  Renu Bryson MD  [address] [facility] [id number] [phone]          Jackei Verdin MD  [address] [facility] [address]  [facility] [id number]  [phone]    Call in 1 day      [facility] EMERGENCY DEPARTMENT  [address]  [facility] [id number] [phone]    If symptoms worsen or any concerns      RX:     Medication List        New Prescriptions      cephalexin 500 MG capsule  Commonly known  as: KEFLEX  Take 1 capsule by mouth 3 (Three) Times a Day for 5 days.               Where to Get Your Medications        These medications were sent to [id number] - [place], [place] - [address] AT [facility] - [phone] PH - [phone] FX  [address], [facility] [address]      Phone: [phone]   cephalexin 500 MG capsule         Latest Documented Vital Signs:  As of 00:58 EDT  BP- 129/73 HR- 100 Temp- 98.4 °F (36.9 °C) (Tympanic) O2 sat- 98%              --    Please note that portions of this were completed with a voice recognition program.       Note Disclaimer: At [facility], we believe that sharing information builds trust and better relationships. You are receiving this note because you are receiving care at [facility] or recently visited. It is possible you will see health information before a provider has talked with you about it. This kind of information can be easy to misunderstand. To help you fully understand what it means for your health, we urge you to discuss this note with your provider.             Carmen Joel PA  09/09/23 0058

## 2023-09-08 NOTE — ED NOTES
Pt found out she was pregnant 3 days ago, pt is having cramping and spotting. Reports irregular periods. She knows she had one in July but unsure when

## 2023-09-09 LAB — BACTERIA SPEC AEROBE CULT: NO GROWTH

## 2023-09-09 NOTE — ED PROVIDER NOTES
MD ATTESTATION NOTE    The DANIKA and I have discussed this patient's history, physical exam, and treatment plan.  I have reviewed the documentation and personally had a face to face interaction with the patient. I affirm the documentation and agree with the treatment and plan.  The attached note describes my personal findings.      I provided a substantive portion of the care of the patient.  I personally performed the physical exam in its entirety, and below are my findings.  For this patient encounter, the patient wore surgical mask, I wore full protective PPE including N95 and eye protection.      Brief HPI: In brief, patient presented emergency department with lower abdominal pain, vaginal spotting.  Approximately 4 weeks pregnant.  -0-2-2.  Denies any other complaints.  Pain is currently improved.    PHYSICAL EXAM  ED Triage Vitals   Temp Heart Rate Resp BP SpO2   23 1507 23 1507 23 1507 23 1556 23 1507   98.4 °F (36.9 °C) 98 16 115/75 98 %      Temp src Heart Rate Source Patient Position BP Location FiO2 (%)   23 1507 23 1507 23 1556 23 1556 --   Tympanic Monitor Sitting Left arm          GENERAL: no acute distress  HENT: nares patent  EYES: no scleral icterus  CV: regular rhythm, normal rate  RESPIRATORY: normal effort  ABDOMEN: soft  MUSCULOSKELETAL: no deformity  NEURO: alert, moves all extremities, follows commands  PSYCH:  calm, cooperative  SKIN: warm, dry    Vital signs and nursing notes reviewed.        Plan: Patient presenting with early pregnancy, vaginal bleeding.  Otherwise well-appearing, vitals otherwise stable.  Exam benign.  Labs demonstrating elevated hormone level, Rh-.  Status post RhoGAM.  Imaging demonstrating possible very early pregnancy.  Plan to discharge with close OB follow-up.    ED Course as of 09/10/23 0115   Fri Sep 08, 2023   2005 Glucose: 92 [KA]   2006 Creatinine: 0.84 [KA]    RH type: Negative [KA]   2006 Glucose:  92 [KA]   2006 Creatinine: 0.84 [KA]   2006 Bacteria, UA(!): 1+ [KA]   2006 I discussed the patient with Kurt, ultrasound technician.  Pelvic ultrasound shows IUP at 4 weeks 6 days.  No other suspicious intrapelvic findings. [KA]   2013 Ultrasound interpreted myself:  Thickened endometrial stripe with small anechoic area consistent with gestational sac, no yolk sac visualized. [FS]   2105 YEAST: No yeast seen [KA]   2105 HYPHAL ELEMENTS: No Hyphal elements seen [KA]   2105 WBC'S: No WBC's seen [KA]   2105 Clue Cells, Wet Prep: No Clue cells seen [KA]   2105 Trichomonas, Wet Prep: No Trichomonas seen [KA]   2109 I reassessed the patient, she is resting comfortably.  She is treated with program due to a negative Rh status, I prescribed Keflex for acute cystitis, ultrasound shows early IUP consistent with dates and quantitative hCG.  I recommended close follow-up with her gynecologist and she should call on Monday to touch base with them to see if they like to see her sooner than her initial prenatal appointment.  She is agreeable with this plan and stable for discharge.  Pelvic rest and precautions discussed including strong return precautions.   [KA]      ED Course User Index  [FS] Ortiz Vale MD  [KA] Carmen Joel, Ortiz Blake MD  09/08/23 2032       Ortiz Vale MD  09/09/23 0018       Ortiz Vale MD  09/10/23 0116

## 2023-09-09 NOTE — DISCHARGE INSTRUCTIONS
You are 4 weeks 5 days pregnant today    Pelvic Rest: No douching, intercourse, or tampon use.  Drink plenty of fluids.   No strenuous activity.  Recheck with your OB/GYN, call when the office opens to discuss when they would like to see you  Return to the ER for bleeding greater than 1 pad per hour for more than 5 hours, severe pain, fever >100.4, passing out, intractable vomiting, any concerns.

## 2023-09-11 LAB
C TRACH RRNA SPEC QL NAA+PROBE: NEGATIVE
N GONORRHOEA RRNA SPEC QL NAA+PROBE: NEGATIVE

## 2023-09-29 ENCOUNTER — INITIAL PRENATAL (OUTPATIENT)
Dept: OBSTETRICS AND GYNECOLOGY | Facility: CLINIC | Age: 23
End: 2023-09-29
Payer: COMMERCIAL

## 2023-09-29 VITALS — SYSTOLIC BLOOD PRESSURE: 124 MMHG | DIASTOLIC BLOOD PRESSURE: 75 MMHG | WEIGHT: 161 LBS | BODY MASS INDEX: 26.79 KG/M2

## 2023-09-29 DIAGNOSIS — Z67.91 RH NEGATIVE, ANTEPARTUM: ICD-10-CM

## 2023-09-29 DIAGNOSIS — Z3A.01 LESS THAN 8 WEEKS GESTATION OF PREGNANCY: Primary | ICD-10-CM

## 2023-09-29 DIAGNOSIS — O21.9 NAUSEA AND VOMITING DURING PREGNANCY: ICD-10-CM

## 2023-09-29 DIAGNOSIS — O26.899 RH NEGATIVE, ANTEPARTUM: ICD-10-CM

## 2023-09-29 RX ORDER — DOXYLAMINE SUCCINATE AND PYRIDOXINE HYDROCHLORIDE 20; 20 MG/1; MG/1
1 TABLET, EXTENDED RELEASE ORAL
Qty: 60 TABLET | Refills: 1 | Status: SHIPPED | OUTPATIENT
Start: 2023-09-29

## 2023-09-29 RX ORDER — CLINDAMYCIN PHOSPHATE 10 MG/G
GEL TOPICAL 2 TIMES DAILY
Qty: 30 G | Refills: 0 | Status: SHIPPED | OUTPATIENT
Start: 2023-09-29

## 2023-09-29 NOTE — PROGRESS NOTES
Initial OB Visit    Chief Complaint   Patient presents with    Initial Prenatal Visit     7w4d          Lara Tijerina is being seen today for her first obstetrical visit.  She is a 23 y.o.    7w4d gestation by US today, irregular ultrasound  FOB: Best Omalley, here with her today   This is not a planned pregnancy.   OB History    Para Term  AB Living   4 3 3   1 3   SAB IAB Ectopic Molar Multiple Live Births   1       1 3      # Outcome Date GA Lbr Peter/2nd Weight Sex Delivery Anes PTL Lv   4 Current            3 Term 19 37w3d 03:44 / 00:07 2767 g (6 lb 1.6 oz) M Vag-Spont EPI N JUAN MANUEL      Birth Comments: Scale 2   2A SAB            2B Term 04/23/15 38w1d / 00:42 3487 g (7 lb 11 oz) F Vag-Spont EPI N JUAN MANUEL   1 Term  38w0d  3487 g (7 lb 11 oz) F Vag-Spont   JUAN MANUEL       Current obstetric complaints: acne, some nausea; vomiting 2 times per day  Reports mood is okay, not on any medications currently  Prior obstetric issues, potential pregnancy concerns: denies  Family history of genetic issues (includes FOB): denies  Prior infections concerning in pregnancy (Rash, fever since LMP): denies  Varicella Hx:  Prior genetic testing: denies  History of abnormal pap smears: denies  History of STIs: thought she had HSV (reports she had negative testing this year with Dr. Bryson by bloodwork), chlamydia  History of HSV in self or partner? See above    Past Medical History:   Diagnosis Date    Herpes        History reviewed. No pertinent surgical history.      Current Outpatient Medications:     clindamycin (Clindagel) 1 % gel, Apply  topically to the appropriate area as directed 2 (Two) Times a Day., Disp: 30 g, Rfl: 0    Doxylamine-Pyridoxine ER (Bonjesta) 20-20 MG tablet controlled-release, Take 1 tablet by mouth Every Morning Before Breakfast. Increase to one tablet QAM and one tablet QPM if needed, Disp: 60 tablet, Rfl: 1    Prenatal Vit w/Oo-Bfauzlhoe-ZH (PNV PO), Take  by mouth., Disp: , Rfl:     Allergies    Allergen Reactions    Azithromycin Hives       Social History     Socioeconomic History    Marital status: Single   Tobacco Use    Smoking status: Some Days     Types: Electronic Cigarette    Smokeless tobacco: Never   Vaping Use    Vaping Use: Some days   Substance and Sexual Activity    Alcohol use: No    Drug use: No    Sexual activity: Yes     Partners: Male     Birth control/protection: Same-sex partner       Family History   Family history unknown: Yes       Review of systems   See HPI         Objective    /75   Wt 73 kg (161 lb)   LMP  (LMP Unknown) Comment: pt states July, her periods are irregular  BMI 26.79 kg/m²       General Appearance:    Alert, cooperative, in no acute distress, habitus normal   Head:    Normocephalic, without obvious abnormality, atraumatic   Eyes:            Lids and lashes normal, conjunctivae and sclerae normal, no   icterus, no pallor, corneas clear   Ears:    Ears appear intact with no abnormalities noted       Neck:   No adenopathy, supple, trachea midline, no thyromegaly   Back:     No kyphosis present, no scoliosis present,                       Lungs:     Clear to auscultation,respirations regular, even and                   unlabored    Heart:    Regular rhythm and normal rate, normal S1 and S2, no            murmur, no gallop, no rub, no click   Breast Exam:    No masses, No nipple discharge   Abdomen:     Normal bowel sounds, no masses, no organomegaly, soft        non-tender, non-distended, no guarding, no rebound                 tenderness   Genitalia:    Vulva - BUS-WNL, NEFG    Vagina - No discharge, No bleeding    Cervix - No Lesions, closed     Uterus - Consistent with 7 weeks    Adnexa - No masses, NT    Pelvimetry - clinically adequate, gynecoid pelvis     Extremities:   Moves all extremities well, no edema, no cyanosis, no              redness   Pulses:   Pulses palpable and equal bilaterally   Skin:   No bleeding, bruising or rash   Lymph nodes:   No  palpable adenopathy   Neurologic:   Sensation intact, A&O times 3      Assessment  Pregnancy at 7w4d   Question of vaginal lesions, HSV - will request testing from PCP as she reports it was negative for IgG  Nausea, vomiting - start Bonjesta  Acne- reviewed use of benzoyl peroxide, clindamycin     Plan    Initial labs drawn, GC/CHL screen done  Patient is on Prenatal vitamins  Problem list reviewed and updated.  Reviewed routine prenatal care with the office to include but not limited to:   Questions regarding activity and food restrictions, avoidance of alcohol, tobacco and drugs and saunas/hot tubs.  Reviewed nature of practice and hospital.  Reviewed recommended follow up, importance of compliance with care. We reviewed testing in pregnancy including HIV testing and urine drug screen.    Reviewed aneuploidy screening and CF/SMA screening.  We reviewed limitations of testing, possibility of false positive/negative results, possible need for other tests as indicated.  She is interested in cell free DNA. Aware of limitations of testing, possibility of need for additional testing (such as amniocentesis), possibility of out of pocket expense, possibility of false positive/false negative results.     Counseled on limitations of ultrasound in pregnancy in detecting aneuploidy/fetal anomalies    Reviewed Body mass index is 26.79 kg/m²..  In pregnancy, her recommended weight gain is 35lbs.  In the first trimester, caloric demand is typically not increased.  In the second and third trimester, the increased demand is approximately 350 and 450 calories respectively.    All questions answered.   Return in about 3 weeks (around 10/20/2023) for OB labs, 4 weeks for OB visit.      Jackie Verdin MD

## 2023-09-29 NOTE — PATIENT INSTRUCTIONS
"Nausea/vomiting in pregnancy:  To help with nausea and vomiting you may try the following over the counter products:  Kimmy chews, kimmy tea, kimmy gum  Mint tea, peppermint gum or candy  B6/Doxylamine: to be taken daily, not just when symptoms occur  Pyridoxine (also known as B6): 10 to 25 mg orally every six to eight hours; the maximum treatment dose suggested for pregnant women is 200 mg/day.  Doxylamine (available in some over-the-counter sleeping pills (eg, Unisom Sleep Tabs) and as an antihistamine chewable tablet (Aldex AN)). One-half of the 25 mg over-the-counter tablet or two chewable 5 mg tablets can be used off-label as an antiemetic  The Association of Profess[employer] of Gyne[employer] and Obstetric[employer] has released a smart phone application that can help you monitor and manage your symptoms.  The Application is \"Managing NVP,\" and has a green icon that says \"APGO WELLMOM.\"    If these do not work, we may need to try prescription medications.    Please contact us if you are unable to tolerate liquids (even sips of water) for over six to eight hours, have weight loss of over 10% of your body weight, blood in vomit, fever (temp of 100.4 or higher), or other concerning symptoms arise.       The following are CPT codes for the optional tests in pregnancy:  Cystic fibrosis screenin  Spinal Muscular atrophy screening 28092  Cell free DNA (Trisomy 21, 18, 13 and sex chromosomes) 76230    The ICD 10 code for most pregnancies is Z34.90     Travel During Pregnancy:  Always use seatbelts.  A lap belt should be worn below the abdomen (across the hips) and the shoulder belt should be worn across the center of your chest (between the breasts) away from your neck.  Do not put the shoulder belt under your arm or behind your back.  Pull any slack out of the belt.  Air travel is safe in most uncomplicated pregnancies, but we do not recommend air travel past 36 weeks.  Airlines may also have restrictions, so check with your " airline before flying.  For some international flights, the travel cut off may be as early as 28 weeks gestation, and some airlines may require letters from your physician.  When going on long trips in car, plane, train, or bus, frequent ambulation is important to prevent blood clots in legs and/or lungs.  The following may help with prevention of blood clots in legs: Drink lots of fluid, wear loose-fitting clothing, walk and stretch at regular intervals.    Avoid areas with Zika outbreaks.  For the latest information, you may visit: www.cdc.gov/travel/notices/.  Resources: , , Committee Opinion 455  Nutrition during pregnancy  Average weight gain during pregnancy is based on your pre-pregnancy body mass index (BMI).  See below for recommended weight gain:  Underweight (BMI <18.5), we recommend 28 to 40lb weight gain  Normal weight (BMI 18.5 to 24.9), we recommend a 25 to 35lb weight gain  Overweight (BMI 25-29.9), we recommend a 15 to 25lb weight gain  Obese (BMI >30), we recommend keeping weight gain under 20 lbs.    You should speak with your physician regarding your specific weight goals for this pregnancy.   Foods to avoid include:   Fish: avoid certain types of fish such as shark, swordfish, netta mackerel, tilefish.  Limit white (albacore) tuna to 6 oz per week.  Choose fish and shellfish such as shrimp, salmon, catfish, [place].  Food-borne illness: Pregnant women are much more likely to get Listeriosis than non-pregnant women.  To help prevent this, avoid eating unpasteurized milk and unpasteurized milk products, hot dogs/lunch meat/cold cuts unless they are heated until steaming right before serving, refrigerated meat spreads, refrigerated smoked seafood, raw or undercooked seafood/eggs/meat.   Vitamin D helps development of the baby’s bones and teeth.  Good sources of Vitamin D include milk fortified with Vitamin D and fatty fish such as salmon.    Folic acid, also known as folate, helps  develop the baby’s brain and spine.  You should make sure your vitamin contains extra folic acid - at least 400mcg.    Iron helps make red blood cells.  You need to make extra red blood cell sin pregnancy.  We recommend eating Iron-rich foods such as lean red meat, poultry, fish, dried beans and peas, iron-fortified cereals, and prune juice.  You may be recommended an iron supplement.  If so, it is absorbed more easily if taken with vitamin C-rich foods such as citrus fruits or tomatoes.    It is important to eat a well balanced diet.  A good recourse for nutrition recommendations is: www.choosemyplate.gov.  Limit caffeine intake to 200mg daily.  Some coffees/teas/sodas have very different levels of caffeine per serving, so check the nutrition labeling.   Resources: , Committee Opinion 548  Exercise during pregnancy  If you are healthy and your pregnancy is normal, it is safe to continue or start most types of exercise.   Physical activity does not increase your risk of miscarriage, low birth weight or early delivery, but you should discuss specific limitations or any complications with your physician.    Benefits of exercise during pregnancy include: reduced back pain, less constipation, promotes overall health and healthy weight gain which may decrease risks for certain pregnancy complications such as diabetes and/or preeclampsia.  The CDC recommends 150 minutes of moderate intensity aerobic exercise per week.  Moderate intensity means that you are moving enough to raise your heart rate and start sweating, but you can talk normally.  Brisk walking, swimming/water workouts, modified yoga/pilates, and use of elliptical machines and/or stationary bikes are examples of aerobic activity.    Precautions:  Stay hydrated.  Drink plenty of water before, during and after your workout  Wear supportive clothing such as a sports bra  Do not become overheated.  Do not exercise outside when it is very hot or humid  Avoid  lying flat on your back as much as possible  AVOID contact sports, skydiving, sports that risk falling (such as skiing, surfing, off-road cycling, gymnastics, horseback riding), hot yoga/hot pilates, scuba diving  Stop exercising if you experience: bleeding from the vagina, feeling dizzy/faint, shortness of breath before starting exercise, chest pain, headache, muscle weakness, calf pain or swelling, regular uterine contractions, fluid leaking from the vagina.    Postpartum exercise: Continuing exercise after you deliver your baby will help boost your energy, strengthen muscles, promote better sleep, and relieve stress.  It also may be useful in preventing postpartum depression.  150 minutes of moderate-intensity aerobic activity is recommended.  Types of exercise and when you can start a regular exercise routine may be limited by the type of delivery you had.  Please discuss with your physician prior to resuming or starting exercise.    Resources: ,   Back pain during pregnancy  Back pain is very common during pregnancy.  It may arise due to strain on your back muscles, weakness of the abdominal muscles, and pregnancy hormones.    To prevent back pain:  Wear shoes with good support. Flat shoes and high heels may not have good arch support.  Consider a firm mattress  Use good lifting practices.  Do not bend from the waist to pick things up.  Squat down and bend your knees, keeping a straight back.  Sit in chairs with good back support or use a small pillow behind your lower back.    Sleep on your side with one or two pillows between your legs  To ease back pain:   Exercise can help stretch strained muscles and strengthen weak muscles to promote good posture  Contact your health care provider with severe pain, pain that persists for more than 2 weeks, fever, burning during urination, or vaginal bleeding.  Resources:

## 2023-10-01 LAB
BACTERIA UR CULT: NORMAL
BACTERIA UR CULT: NORMAL

## 2023-10-02 LAB
C TRACH RRNA SPEC QL NAA+PROBE: NEGATIVE
N GONORRHOEA RRNA SPEC QL NAA+PROBE: NEGATIVE
T VAGINALIS RRNA SPEC QL NAA+PROBE: NEGATIVE

## 2023-10-03 LAB
CONV .: NORMAL
CYTOLOGIST CVX/VAG CYTO: NORMAL
CYTOLOGY CVX/VAG DOC CYTO: NORMAL
CYTOLOGY CVX/VAG DOC THIN PREP: NORMAL
DX ICD CODE: NORMAL
HIV 1 & 2 AB SER-IMP: NORMAL
OTHER STN SPEC: NORMAL
STAT OF ADQ CVX/VAG CYTO-IMP: NORMAL

## 2023-10-23 LAB
ABO GROUP BLD: ABNORMAL
BASOPHILS # BLD AUTO: 0 X10E3/UL (ref 0–0.2)
BASOPHILS NFR BLD AUTO: 0 %
BLD GP AB SCN SERPL QL: ABNORMAL
BLD GP AB SCN SERPL QL: POSITIVE
BLOOD GROUP ANTIBODIES SERPL: ABNORMAL
EOSINOPHIL # BLD AUTO: 0.1 X10E3/UL (ref 0–0.4)
EOSINOPHIL NFR BLD AUTO: 1 %
ERYTHROCYTE [DISTWIDTH] IN BLOOD BY AUTOMATED COUNT: 11.9 % (ref 11.7–15.4)
HBV SURFACE AG SERPL QL IA: NEGATIVE
HCT VFR BLD AUTO: 41.1 % (ref 34–46.6)
HCV IGG SERPL QL IA: NON REACTIVE
HGB BLD-MCNC: 14.3 G/DL (ref 11.1–15.9)
HIV 1+2 AB+HIV1 P24 AG SERPL QL IA: NON REACTIVE
IMM GRANULOCYTES # BLD AUTO: 0.1 X10E3/UL (ref 0–0.1)
IMM GRANULOCYTES NFR BLD AUTO: 1 %
LYMPHOCYTES # BLD AUTO: 2.1 X10E3/UL (ref 0.7–3.1)
LYMPHOCYTES NFR BLD AUTO: 19 %
MCH RBC QN AUTO: 31.4 PG (ref 26.6–33)
MCHC RBC AUTO-ENTMCNC: 34.8 G/DL (ref 31.5–35.7)
MCV RBC AUTO: 90 FL (ref 79–97)
MONOCYTES # BLD AUTO: 0.7 X10E3/UL (ref 0.1–0.9)
MONOCYTES NFR BLD AUTO: 7 %
NEUTROPHILS # BLD AUTO: 7.9 X10E3/UL (ref 1.4–7)
NEUTROPHILS NFR BLD AUTO: 72 %
PLATELET # BLD AUTO: 302 X10E3/UL (ref 150–450)
RBC # BLD AUTO: 4.55 X10E6/UL (ref 3.77–5.28)
RH BLD: NEGATIVE
RPR SER QL: NON REACTIVE
RUBV IGG SERPL IA-ACNC: <0.9 INDEX
WBC # BLD AUTO: 10.8 X10E3/UL (ref 3.4–10.8)
XXX BLOOD GROUP AB TITR SERPL AHG: ABNORMAL {TITER}

## 2023-10-26 LAB
CFDNA.FET/CFDNA.TOTAL SFR FETUS: NORMAL %
CITATION REF LAB TEST: NORMAL
FET 13+18+21+X+Y ANEUP PLAS.CFDNA: NEGATIVE
FET CHR 21 TS PLAS.CFDNA QL: NEGATIVE
FET MS X RISK WBC.DNA+CFDNA QL: NOT DETECTED
FET SEX PLAS.CFDNA DOSAGE CFDNA: NORMAL
FET TS 13 RISK PLAS.CFDNA QL: NEGATIVE
FET TS 18 RISK WBC.DNA+CFDNA QL: NEGATIVE
FET X + Y ANEUP RISK PLAS.CFDNA SEQ-IMP: NOT DETECTED
GA EST FROM CONCEPTION DATE: NORMAL D
GESTATIONAL AGE > 9:: YES
LAB DIRECTOR NAME PROVIDER: NORMAL
LAB DIRECTOR NAME PROVIDER: NORMAL
LABORATORY COMMENT REPORT: NORMAL
LIMITATIONS OF THE TEST: NORMAL
NEGATIVE PREDICTIVE VALUE: NORMAL
NOTE: NORMAL
PERFORMANCE CHARACTERISTICS: NORMAL
POSITIVE PREDICTIVE VALUE: NORMAL
REF LAB TEST METHOD: NORMAL
TEST PERFORMANCE INFO SPEC: NORMAL

## 2023-10-27 ENCOUNTER — ROUTINE PRENATAL (OUTPATIENT)
Dept: OBSTETRICS AND GYNECOLOGY | Facility: CLINIC | Age: 23
End: 2023-10-27
Payer: COMMERCIAL

## 2023-10-27 VITALS — WEIGHT: 156 LBS | BODY MASS INDEX: 25.96 KG/M2 | SYSTOLIC BLOOD PRESSURE: 108 MMHG | DIASTOLIC BLOOD PRESSURE: 71 MMHG

## 2023-10-27 DIAGNOSIS — O26.899 RH NEGATIVE, ANTEPARTUM: ICD-10-CM

## 2023-10-27 DIAGNOSIS — Z28.39 RUBELLA NON-IMMUNE STATUS, ANTEPARTUM: ICD-10-CM

## 2023-10-27 DIAGNOSIS — O21.9 NAUSEA AND VOMITING DURING PREGNANCY: ICD-10-CM

## 2023-10-27 DIAGNOSIS — Z3A.11 11 WEEKS GESTATION OF PREGNANCY: Primary | ICD-10-CM

## 2023-10-27 DIAGNOSIS — Z67.91 RH NEGATIVE, ANTEPARTUM: ICD-10-CM

## 2023-10-27 DIAGNOSIS — O09.899 RUBELLA NON-IMMUNE STATUS, ANTEPARTUM: ICD-10-CM

## 2023-10-27 LAB
GLUCOSE UR STRIP-MCNC: NEGATIVE MG/DL
PROT UR STRIP-MCNC: NEGATIVE MG/DL

## 2023-10-27 RX ORDER — PNV NO.95/FERROUS FUM/FOLIC AC 28MG-0.8MG
1 TABLET ORAL DAILY
Qty: 90 TABLET | Refills: 4 | Status: SHIPPED | OUTPATIENT
Start: 2023-10-27

## 2023-10-27 RX ORDER — DOXYLAMINE SUCCINATE AND PYRIDOXINE HYDROCHLORIDE 20; 20 MG/1; MG/1
1 TABLET, EXTENDED RELEASE ORAL
Qty: 60 TABLET | Refills: 1 | Status: SHIPPED | OUTPATIENT
Start: 2023-10-27

## 2023-10-27 RX ORDER — PROMETHAZINE HYDROCHLORIDE 12.5 MG/1
12.5 TABLET ORAL EVERY 6 HOURS PRN
Qty: 30 TABLET | Refills: 0 | Status: SHIPPED | OUTPATIENT
Start: 2023-10-27

## 2023-10-27 RX ORDER — PNV NO.95/FERROUS FUM/FOLIC AC 28MG-0.8MG
1 TABLET ORAL DAILY
Qty: 90 TABLET | Refills: 4 | Status: SHIPPED | OUTPATIENT
Start: 2023-10-27 | End: 2023-10-27 | Stop reason: SDUPTHER

## 2023-10-27 RX ORDER — ONDANSETRON 4 MG/1
4 TABLET, FILM COATED ORAL DAILY PRN
Qty: 30 TABLET | Refills: 1 | Status: SHIPPED | OUTPATIENT
Start: 2023-10-27 | End: 2024-10-26

## 2023-10-27 NOTE — PATIENT INSTRUCTIONS
B6: Also known as pyridoxine - please take 10 to 25 mg orally every six to eight hours; the maximum treatment dose suggested for pregnant women is 200 mg/day.  Doxylamine: is available in some over-the-counter sleeping pills (eg, Unisom Sleep Tabs). One-half of the 25 mg over-the-counter tablet can be used as an antiemetic    Additionally, try the following over the counter products:  Kimmy chews, kimmy tea, kimmy gum  Mint tea, peppermint gum or candy    If these do not work, we may need to try prescription medications.    Please contact us if unable to tolerate liquids (even sips of water) for over six to eight hours, have weight loss of over 10% of your body weight, blood in vomit, fever (temp of 100.4 or higher), or other concerning symptoms arise.

## 2023-10-27 NOTE — PROGRESS NOTES
Chief Complaint   Patient presents with    Routine Prenatal Visit      Lara Tijerina is a 23 y.o.  at 11w4d  here for routine OB visit  Reports she is still having nausea and vomiting. She is taking the Bonjesta and feels that some days it sis working and somedays it is not.    /71   Wt 70.8 kg (156 lb)   LMP  (LMP Unknown) Comment: pt states July, her periods are irregular  BMI 25.96 kg/m²    Fetal Heart Rate: 150   Gen: NAD, well appearing  Abd: nontender    See OB Flowsheet    ASSESSMENT/PLAN:  (Z3A.11) 11 weeks gestation of pregnancy - Plan: POC Urinalysis Dipstick    (O21.9) Nausea and vomiting during pregnancy    (O26.899,  Z67.91) Rh negative, antepartum    (O09.899,  Z28.39) Rubella non-immune status, antepartum    She is aware of labs including Rub nonimmune, AB screen positive  Plan for antibody screen next visit  Having gender reveal soon  Added zofran and phenergan to Rx.  Counseled on use.     Return in about 2 weeks (around 11/10/2023).

## 2023-11-10 ENCOUNTER — ROUTINE PRENATAL (OUTPATIENT)
Dept: OBSTETRICS AND GYNECOLOGY | Facility: CLINIC | Age: 23
End: 2023-11-10
Payer: COMMERCIAL

## 2023-11-10 VITALS — DIASTOLIC BLOOD PRESSURE: 69 MMHG | WEIGHT: 156 LBS | BODY MASS INDEX: 25.96 KG/M2 | SYSTOLIC BLOOD PRESSURE: 111 MMHG

## 2023-11-10 DIAGNOSIS — Z67.91 RH NEGATIVE, ANTEPARTUM: ICD-10-CM

## 2023-11-10 DIAGNOSIS — Z13.89 SCREENING FOR BLOOD OR PROTEIN IN URINE: Primary | ICD-10-CM

## 2023-11-10 DIAGNOSIS — O26.899 RH NEGATIVE, ANTEPARTUM: ICD-10-CM

## 2023-11-10 LAB
GLUCOSE UR STRIP-MCNC: NEGATIVE MG/DL
PROT UR STRIP-MCNC: NEGATIVE MG/DL

## 2023-11-10 NOTE — PROGRESS NOTES
Chief Complaint   Patient presents with    Routine Prenatal Visit     13w4d      Lara Tijerina is a 23 y.o.  at 13w4d  here for routine OB visit  Reports no issues  Nausea improved    /69   Wt 70.8 kg (156 lb)   LMP  (LMP Unknown) Comment: pt states July, her periods are irregular  BMI 25.96 kg/m²        Gen: NAD, well appearing  Abd: nontender    See OB Flowsheet    ASSESSMENT/PLAN:  (Z13.89) Screening for blood or protein in urine - Plan: POC Urinalysis Dipstick    (O26.899,  Z67.91) Rh negative, antepartum - Plan: Antibody Screen    Routine counseling pertinent to this stage of pregnancy was reviewed including second trimester precautions.  Declines flu.  Counseled on risks of flu in pregnancy.  Counseled on benefits of vaccination.  Counseled on use of Tamiflu for prophylaxis if exposed to flu or for treatment with onset of flu like symptoms.     Return in about 4 weeks (around 2023) for OB visit, 8 weeks OB visit with anatomy US.

## 2023-11-11 LAB — BLD GP AB SCN SERPL QL: NEGATIVE

## 2023-12-07 ENCOUNTER — ROUTINE PRENATAL (OUTPATIENT)
Dept: OBSTETRICS AND GYNECOLOGY | Facility: CLINIC | Age: 23
End: 2023-12-07
Payer: COMMERCIAL

## 2023-12-07 VITALS — BODY MASS INDEX: 25.59 KG/M2 | WEIGHT: 153.8 LBS | DIASTOLIC BLOOD PRESSURE: 70 MMHG | SYSTOLIC BLOOD PRESSURE: 104 MMHG

## 2023-12-07 DIAGNOSIS — L02.92 FURUNCLE: ICD-10-CM

## 2023-12-07 DIAGNOSIS — Z3A.17 17 WEEKS GESTATION OF PREGNANCY: Primary | ICD-10-CM

## 2023-12-07 DIAGNOSIS — O26.899 RH NEGATIVE, ANTEPARTUM: ICD-10-CM

## 2023-12-07 DIAGNOSIS — Z67.91 RH NEGATIVE, ANTEPARTUM: ICD-10-CM

## 2023-12-07 LAB
GLUCOSE UR STRIP-MCNC: NEGATIVE MG/DL
PROT UR STRIP-MCNC: ABNORMAL MG/DL

## 2023-12-07 RX ORDER — FAMOTIDINE 20 MG/1
20 TABLET, FILM COATED ORAL 2 TIMES DAILY PRN
Qty: 60 TABLET | Refills: 1 | Status: SHIPPED | OUTPATIENT
Start: 2023-12-07 | End: 2024-12-06

## 2023-12-07 RX ORDER — FAMOTIDINE 20 MG/1
20 TABLET, FILM COATED ORAL 2 TIMES DAILY PRN
Qty: 180 TABLET | OUTPATIENT
Start: 2023-12-07

## 2023-12-07 RX ORDER — CEPHALEXIN 500 MG/1
500 CAPSULE ORAL 4 TIMES DAILY
Qty: 40 CAPSULE | Refills: 0 | Status: SHIPPED | OUTPATIENT
Start: 2023-12-07 | End: 2023-12-17

## 2023-12-07 NOTE — PROGRESS NOTES
Chief Complaint   Patient presents with    Routine Prenatal Visit     17w3d      Lara Tijerina is a 23 y.o.  at 17w3d  here for routine OB visit  Reports a red, irritated area on right labia that has had some intermittent drainage. No fever. Started around the time of the pregnancy, similar to cyst she had on her back  Awaiting paternity test  C/o decreased appetite and decreased PO intake.    Still taking meds for nausea but does feel some GERD  /70   Wt 69.8 kg (153 lb 12.8 oz)   LMP  (LMP Unknown) Comment: pt states July, her periods are irregular  BMI 25.59 kg/m²        Gen: NAD, well appearing  Abd: nontender  Pelvic: red raised area on right labia majora with minimal fluctuance    See OB Flowsheet    ASSESSMENT/PLAN:  (Z3A.17) 17 weeks gestation of pregnancy - Plan: POC Urinalysis Dipstick    (O26.899,  Z67.91) Rh negative, antepartum    (L02.92) Furuncle  Pepcid Rx sent, reviewed caloric recommendations in pregnancy  Recommend Keflex for possible cellulitis/furuncle. Call if worsening or with fever, other concern   Routine counseling pertinent to this stage of pregnancy was reviewed including anatomy US next visit  Return in about 4 weeks (around 2024) for anatomy US, OB visit.

## 2024-01-04 ENCOUNTER — ROUTINE PRENATAL (OUTPATIENT)
Dept: OBSTETRICS AND GYNECOLOGY | Facility: CLINIC | Age: 24
End: 2024-01-04
Payer: COMMERCIAL

## 2024-01-04 VITALS — BODY MASS INDEX: 25.73 KG/M2 | DIASTOLIC BLOOD PRESSURE: 67 MMHG | SYSTOLIC BLOOD PRESSURE: 116 MMHG | WEIGHT: 154.6 LBS

## 2024-01-04 DIAGNOSIS — Z67.91 RH NEGATIVE, ANTEPARTUM: ICD-10-CM

## 2024-01-04 DIAGNOSIS — K59.09 OTHER CONSTIPATION: ICD-10-CM

## 2024-01-04 DIAGNOSIS — O26.899 RH NEGATIVE, ANTEPARTUM: ICD-10-CM

## 2024-01-04 DIAGNOSIS — Z3A.21 21 WEEKS GESTATION OF PREGNANCY: Primary | ICD-10-CM

## 2024-01-04 DIAGNOSIS — O44.20 MARGINAL PLACENTA PREVIA: ICD-10-CM

## 2024-01-04 LAB
GLUCOSE UR STRIP-MCNC: NEGATIVE MG/DL
PROT UR STRIP-MCNC: NEGATIVE MG/DL

## 2024-01-04 RX ORDER — DOCUSATE SODIUM 100 MG/1
100 CAPSULE, LIQUID FILLED ORAL 2 TIMES DAILY
Qty: 60 CAPSULE | Refills: 1 | Status: SHIPPED | OUTPATIENT
Start: 2024-01-04

## 2024-01-04 NOTE — PROGRESS NOTES
Chief Complaint   Patient presents with    Routine Prenatal Visit     21w3d  Anatomy today       Lara Tijerina is a 23 y.o.  at 21w3d  here for routine OB visit  Reports some constipation. Has taken colace in past  Denies vaginal bleeding. Has felt fetal movements  Reports labial lesion resolved  /67   Wt 70.1 kg (154 lb 9.6 oz)   LMP  (LMP Unknown) Comment: pt states July, her periods are irregular  BMI 25.73 kg/m²    Fetal Heart Rate: +  Movement: Present  Presentation: Breech   Gen: NAD, well appearing  Abd: nontender    See OB Flowsheet    ASSESSMENT/PLAN:  (Z3A.21) 21 weeks gestation of pregnancy - Plan: POC Urinalysis Dipstick, Gestational Screen 1 Hr ([facility]), CBC (No Diff), Antibody Screen, US Ob 14 + Weeks Single or First Gestation    (O44.20) Marginal placenta previa - Plan: US Ob 14 + Weeks Single or First Gestation    (K59.09) Other constipation    (O26.899,  Z67.91) Rh negative, antepartum    Reviewed US and finding of marginal previa. Discussed importance of pelvic rest and bleeding precautions. Will repeat US with next visit  Plan for GCT, CBC, AB screen next visit. Rhogam to be given around 28 weeks  Colace for constipation    Return in about 4 weeks (around 2024) for GCT, OB visit, repeat ultrasound.

## 2024-02-01 ENCOUNTER — REFERRAL TRIAGE (OUTPATIENT)
Dept: LABOR AND DELIVERY | Facility: HOSPITAL | Age: 24
End: 2024-02-01
Payer: COMMERCIAL

## 2024-02-02 ENCOUNTER — ROUTINE PRENATAL (OUTPATIENT)
Dept: OBSTETRICS AND GYNECOLOGY | Facility: CLINIC | Age: 24
End: 2024-02-02
Payer: COMMERCIAL

## 2024-02-02 VITALS — DIASTOLIC BLOOD PRESSURE: 75 MMHG | SYSTOLIC BLOOD PRESSURE: 114 MMHG | BODY MASS INDEX: 26.33 KG/M2 | WEIGHT: 158.2 LBS

## 2024-02-02 DIAGNOSIS — Z3A.25 25 WEEKS GESTATION OF PREGNANCY: Primary | ICD-10-CM

## 2024-02-02 DIAGNOSIS — Z67.91 RH NEGATIVE, ANTEPARTUM: ICD-10-CM

## 2024-02-02 DIAGNOSIS — O26.899 RH NEGATIVE, ANTEPARTUM: ICD-10-CM

## 2024-02-02 LAB
GLUCOSE UR STRIP-MCNC: NEGATIVE MG/DL
PROT UR STRIP-MCNC: NEGATIVE MG/DL

## 2024-02-02 RX ORDER — DOCUSATE SODIUM 100 MG/1
100 CAPSULE, LIQUID FILLED ORAL 2 TIMES DAILY
Qty: 60 CAPSULE | Refills: 1 | Status: SHIPPED | OUTPATIENT
Start: 2024-02-02

## 2024-02-02 NOTE — PROGRESS NOTES
Chief Complaint   Patient presents with    Routine Prenatal Visit      Lara Tijerina is a 24 y.o.  at 25w4d  here for routine OB visit  Reports no bleeding. She does feel tired but cannot sleep at night. Nausea has improved. Was not able to  colace as pharmacy said they did not have it.  She c/o some forgetfulness    /75   Wt 71.8 kg (158 lb 3.2 oz)   LMP  (LMP Unknown) Comment: pt states July, her periods are irregular  BMI 26.33 kg/m²        Gen: NAD, well appearing  Abd: nontender      See OB Flowsheet    ASSESSMENT/PLAN:  (Z3A.25) 25 weeks gestation of pregnancy - Plan: POC Urinalysis Dipstick    (O26.899,  Z67.91) Rh negative, antepartum - Plan: Gestational Screen 1 Hr ([facility]), CBC (No Diff), Antibody Screen    Reviewed okay to use doxylamine as a sleep aid if she wishes. Discussed sleep hygiene  Confirmed pharmacy and re-sent colace  Reviewed US which shows resolution of marginal previa  Routine counseling pertinent to this stage of pregnancy was reviewed including second trimester precaution  Return in about 3 weeks (around 2024) for Rhogam, OB visit.

## 2024-02-03 ENCOUNTER — TELEPHONE (OUTPATIENT)
Dept: OBSTETRICS AND GYNECOLOGY | Facility: CLINIC | Age: 24
End: 2024-02-03
Payer: COMMERCIAL

## 2024-02-03 DIAGNOSIS — O99.810 ABNORMAL GLUCOSE IN PREGNANCY, ANTEPARTUM: Primary | ICD-10-CM

## 2024-02-03 LAB
BLD GP AB SCN SERPL QL: NEGATIVE
ERYTHROCYTE [DISTWIDTH] IN BLOOD BY AUTOMATED COUNT: 12.7 % (ref 12.3–15.4)
GLUCOSE 1H P 50 G GLC PO SERPL-MCNC: 144 MG/DL (ref 65–139)
HCT VFR BLD AUTO: 35.8 % (ref 34–46.6)
HGB BLD-MCNC: 12.1 G/DL (ref 12–15.9)
MCH RBC QN AUTO: 31.7 PG (ref 26.6–33)
MCHC RBC AUTO-ENTMCNC: 33.8 G/DL (ref 31.5–35.7)
MCV RBC AUTO: 93.7 FL (ref 79–97)
PLATELET # BLD AUTO: 330 10*3/MM3 (ref 140–450)
RBC # BLD AUTO: 3.82 10*6/MM3 (ref 3.77–5.28)
WBC # BLD AUTO: 15.37 10*3/MM3 (ref 3.4–10.8)

## 2024-02-07 LAB
GLUCOSE 1H P 100 G GLC PO SERPL-MCNC: 77 MG/DL (ref 65–179)
GLUCOSE 2H P 100 G GLC PO SERPL-MCNC: 53 MG/DL (ref 65–154)
GLUCOSE 3H P 100 G GLC PO SERPL-MCNC: 62 MG/DL (ref 65–139)
GLUCOSE P FAST SERPL-MCNC: 72 MG/DL (ref 65–94)

## 2024-02-13 RX ORDER — FAMOTIDINE 20 MG/1
20 TABLET, FILM COATED ORAL 2 TIMES DAILY PRN
Qty: 180 TABLET | Refills: 1 | Status: SHIPPED | OUTPATIENT
Start: 2024-02-13

## 2024-02-22 ENCOUNTER — ROUTINE PRENATAL (OUTPATIENT)
Dept: OBSTETRICS AND GYNECOLOGY | Facility: CLINIC | Age: 24
End: 2024-02-22
Payer: COMMERCIAL

## 2024-02-22 ENCOUNTER — CLINICAL SUPPORT (OUTPATIENT)
Dept: OBSTETRICS AND GYNECOLOGY | Facility: CLINIC | Age: 24
End: 2024-02-22
Payer: COMMERCIAL

## 2024-02-22 VITALS — SYSTOLIC BLOOD PRESSURE: 102 MMHG | BODY MASS INDEX: 26.99 KG/M2 | DIASTOLIC BLOOD PRESSURE: 68 MMHG | WEIGHT: 162.2 LBS

## 2024-02-22 DIAGNOSIS — Z13.89 SCREENING FOR BLOOD OR PROTEIN IN URINE: ICD-10-CM

## 2024-02-22 DIAGNOSIS — Z67.91 RH NEGATIVE, ANTEPARTUM: Primary | ICD-10-CM

## 2024-02-22 DIAGNOSIS — Z3A.28 28 WEEKS GESTATION OF PREGNANCY: ICD-10-CM

## 2024-02-22 DIAGNOSIS — O26.899 RH NEGATIVE, ANTEPARTUM: Primary | ICD-10-CM

## 2024-02-22 DIAGNOSIS — O26.893 RH NEGATIVE STATUS DURING PREGNANCY IN THIRD TRIMESTER: Primary | ICD-10-CM

## 2024-02-22 DIAGNOSIS — Z67.91 RH NEGATIVE STATUS DURING PREGNANCY IN THIRD TRIMESTER: Primary | ICD-10-CM

## 2024-02-22 PROBLEM — Z86.19 HISTORY OF HERPES GENITALIS: Status: RESOLVED | Noted: 2019-02-14 | Resolved: 2024-02-22

## 2024-02-22 LAB
GLUCOSE UR STRIP-MCNC: NEGATIVE MG/DL
PROT UR STRIP-MCNC: ABNORMAL MG/DL

## 2024-02-22 NOTE — PROGRESS NOTES
Chief Complaint   Patient presents with    Routine Prenatal Visit      Lara Tijerina is a 24 y.o.  at 28w3d  here for routine OB visit  Reports no issues. She brought titers for HSV Type 1 and 2 IgG from 2023 that were negative    /68   Wt 73.6 kg (162 lb 3.2 oz)   LMP  (LMP Unknown) Comment: pt states July, her periods are irregular  BMI 26.99 kg/m²    Movement: Present   Gen: NAD, well appearing  Abd: nontender    See OB Flowsheet    ASSESSMENT/PLAN:  Diagnoses and all orders for this visit:    1. Rh negative, antepartum (Primary)    2. 28 weeks gestation of pregnancy    Reviewed titers, will scan to chart  Reviewed need for Rhogam- will go to  today to get this.   Routine counseling pertinent to this stage of pregnancy   Return in about 2 weeks (around 3/7/2024).

## 2024-02-22 NOTE — PROGRESS NOTES
Patient is her for Rh injection of the Left arm. Office supplied, no reaction. Lot # [id number], Exp. Date [id number], ndc [id number].

## 2024-02-23 RX ORDER — PROMETHAZINE HYDROCHLORIDE 12.5 MG/1
12.5 TABLET ORAL EVERY 6 HOURS PRN
Qty: 30 TABLET | Refills: 0 | Status: SHIPPED | OUTPATIENT
Start: 2024-02-23

## 2024-02-23 NOTE — TELEPHONE ENCOUNTER
Pt requesting Phenergan refill please. Says she cannot keep anything down    Pharmacy -   [id number] - [place], [place] - [address] AT [facility] - [phone] [facility] [phone] FX

## 2024-02-29 ENCOUNTER — PATIENT OUTREACH (OUTPATIENT)
Dept: LABOR AND DELIVERY | Facility: HOSPITAL | Age: 24
End: 2024-02-29
Payer: COMMERCIAL

## 2024-02-29 NOTE — OUTREACH NOTE
[facility]    Spoke with pt to intro program, line went blank, called again to leave vm.  Will try again later.    Chetan Frey RN  Maternity Nurse Navigator    2/29/2024, 13:22 EST    Pt called back:    [facility]  Enrollment    Current Estimated Gestational Age: 29w3d    Questions/Answers      Flowsheet Row Responses   Would like to participate? No            Declined program, will reach out in future as needed.    Chetan Frey RN  Maternity Nurse Navigator    2/29/2024, 13:53 EST

## 2024-03-04 ENCOUNTER — TELEPHONE (OUTPATIENT)
Dept: OBSTETRICS AND GYNECOLOGY | Facility: CLINIC | Age: 24
End: 2024-03-04
Payer: COMMERCIAL

## 2024-03-04 ENCOUNTER — APPOINTMENT (OUTPATIENT)
Dept: GENERAL RADIOLOGY | Facility: HOSPITAL | Age: 24
End: 2024-03-04
Payer: COMMERCIAL

## 2024-03-04 ENCOUNTER — HOSPITAL ENCOUNTER (EMERGENCY)
Facility: HOSPITAL | Age: 24
Discharge: LEFT BEFORE DISCHARGE | End: 2024-03-04
Payer: COMMERCIAL

## 2024-03-04 VITALS
HEART RATE: 129 BPM | SYSTOLIC BLOOD PRESSURE: 131 MMHG | DIASTOLIC BLOOD PRESSURE: 94 MMHG | RESPIRATION RATE: 18 BRPM | OXYGEN SATURATION: 97 % | TEMPERATURE: 100.2 F

## 2024-03-04 LAB
ALBUMIN SERPL-MCNC: 3.5 G/DL (ref 3.5–5.2)
ALBUMIN/GLOB SERPL: 1.3 G/DL
ALP SERPL-CCNC: 135 U/L (ref 39–117)
ALT SERPL W P-5'-P-CCNC: 9 U/L (ref 1–33)
ANION GAP SERPL CALCULATED.3IONS-SCNC: 14.7 MMOL/L (ref 5–15)
AST SERPL-CCNC: 16 U/L (ref 1–32)
BASOPHILS # BLD AUTO: 0.03 10*3/MM3 (ref 0–0.2)
BASOPHILS NFR BLD AUTO: 0.3 % (ref 0–1.5)
BILIRUB SERPL-MCNC: <0.2 MG/DL (ref 0–1.2)
BUN SERPL-MCNC: 2 MG/DL (ref 6–20)
BUN/CREAT SERPL: 3.2 (ref 7–25)
CALCIUM SPEC-SCNC: 7.8 MG/DL (ref 8.6–10.5)
CHLORIDE SERPL-SCNC: 103 MMOL/L (ref 98–107)
CO2 SERPL-SCNC: 16.3 MMOL/L (ref 22–29)
CREAT SERPL-MCNC: 0.62 MG/DL (ref 0.57–1)
DEPRECATED RDW RBC AUTO: 41.6 FL (ref 37–54)
EGFRCR SERPLBLD CKD-EPI 2021: 126.1 ML/MIN/1.73
EOSINOPHIL # BLD AUTO: 0.01 10*3/MM3 (ref 0–0.4)
EOSINOPHIL NFR BLD AUTO: 0.1 % (ref 0.3–6.2)
ERYTHROCYTE [DISTWIDTH] IN BLOOD BY AUTOMATED COUNT: 12.9 % (ref 12.3–15.4)
GLOBULIN UR ELPH-MCNC: 2.6 GM/DL
GLUCOSE SERPL-MCNC: 109 MG/DL (ref 65–99)
HCT VFR BLD AUTO: 37.7 % (ref 34–46.6)
HGB BLD-MCNC: 13 G/DL (ref 12–15.9)
HOLD SPECIMEN: NORMAL
HOLD SPECIMEN: NORMAL
IMM GRANULOCYTES # BLD AUTO: 0.13 10*3/MM3 (ref 0–0.05)
IMM GRANULOCYTES NFR BLD AUTO: 1.3 % (ref 0–0.5)
LYMPHOCYTES # BLD AUTO: 2.21 10*3/MM3 (ref 0.7–3.1)
LYMPHOCYTES NFR BLD AUTO: 22.7 % (ref 19.6–45.3)
MCH RBC QN AUTO: 31.1 PG (ref 26.6–33)
MCHC RBC AUTO-ENTMCNC: 34.5 G/DL (ref 31.5–35.7)
MCV RBC AUTO: 90.2 FL (ref 79–97)
MONOCYTES # BLD AUTO: 0.43 10*3/MM3 (ref 0.1–0.9)
MONOCYTES NFR BLD AUTO: 4.4 % (ref 5–12)
NEUTROPHILS NFR BLD AUTO: 6.93 10*3/MM3 (ref 1.7–7)
NEUTROPHILS NFR BLD AUTO: 71.2 % (ref 42.7–76)
NRBC BLD AUTO-RTO: 0 /100 WBC (ref 0–0.2)
NT-PROBNP SERPL-MCNC: <36 PG/ML (ref 0–450)
PLATELET # BLD AUTO: 207 10*3/MM3 (ref 140–450)
PMV BLD AUTO: 9.2 FL (ref 6–12)
POTASSIUM SERPL-SCNC: 2.6 MMOL/L (ref 3.5–5.2)
PROT SERPL-MCNC: 6.1 G/DL (ref 6–8.5)
QT INTERVAL: 336 MS
QTC INTERVAL: 427 MS
RBC # BLD AUTO: 4.18 10*6/MM3 (ref 3.77–5.28)
SODIUM SERPL-SCNC: 134 MMOL/L (ref 136–145)
TROPONIN T SERPL HS-MCNC: 8 NG/L
WBC NRBC COR # BLD AUTO: 9.74 10*3/MM3 (ref 3.4–10.8)
WHOLE BLOOD HOLD COAG: NORMAL
WHOLE BLOOD HOLD SPECIMEN: NORMAL

## 2024-03-04 PROCEDURE — 36415 COLL VENOUS BLD VENIPUNCTURE: CPT

## 2024-03-04 PROCEDURE — 99211 OFF/OP EST MAY X REQ PHY/QHP: CPT

## 2024-03-04 PROCEDURE — 93010 ELECTROCARDIOGRAM REPORT: CPT | Performed by: STUDENT IN AN ORGANIZED HEALTH CARE EDUCATION/TRAINING PROGRAM

## 2024-03-04 PROCEDURE — 93005 ELECTROCARDIOGRAM TRACING: CPT

## 2024-03-04 PROCEDURE — 84484 ASSAY OF TROPONIN QUANT: CPT

## 2024-03-04 PROCEDURE — 85025 COMPLETE CBC W/AUTO DIFF WBC: CPT

## 2024-03-04 PROCEDURE — 83880 ASSAY OF NATRIURETIC PEPTIDE: CPT

## 2024-03-04 PROCEDURE — 80053 COMPREHEN METABOLIC PANEL: CPT

## 2024-03-04 RX ORDER — SODIUM CHLORIDE 0.9 % (FLUSH) 0.9 %
10 SYRINGE (ML) INJECTION AS NEEDED
Status: DISCONTINUED | OUTPATIENT
Start: 2024-03-04 | End: 2024-03-04 | Stop reason: HOSPADM

## 2024-03-04 NOTE — ED TRIAGE NOTES
Pt complains of a cough, fever, and SOA for the last 4 days. Complains of all over body aches as well.

## 2024-03-04 NOTE — ED NOTES
Pt has not answered for registration or room placement x several times.  Lab called triage to advise pt potassium 2.6.  called pt [phone].  She will come back to ER

## 2024-03-04 NOTE — TELEPHONE ENCOUNTER
Yes, she did go to the ED. She just wants to know what else she can take while pregnant. The options and medicine they gave her at the hospital are not helping at all.     Please advise, thanks!

## 2024-03-04 NOTE — TELEPHONE ENCOUNTER
Dr. Verdin OB pt 30 wks    States she has been dealing with bronchitis for awhile now. Says her cough/throat are very bad and not seeing any improvement with cough drops or Mucinex. Offered pt other safe options in pregnancy, pt would like to know if there is anything else provider suggest or can be prescribed?    Please advise, thank you

## 2024-03-04 NOTE — TELEPHONE ENCOUNTER
You can go over the over-the-counter med list with her.  She can take Robitussin for cough.  She can take Benadryl or Claritin for allergy symptoms.  She can take Mucinex for decongestion.

## 2024-03-08 ENCOUNTER — ROUTINE PRENATAL (OUTPATIENT)
Dept: OBSTETRICS AND GYNECOLOGY | Facility: CLINIC | Age: 24
End: 2024-03-08
Payer: COMMERCIAL

## 2024-03-08 VITALS — BODY MASS INDEX: 25.69 KG/M2 | DIASTOLIC BLOOD PRESSURE: 70 MMHG | WEIGHT: 154.4 LBS | SYSTOLIC BLOOD PRESSURE: 104 MMHG

## 2024-03-08 DIAGNOSIS — Z67.91 RH NEGATIVE, ANTEPARTUM: ICD-10-CM

## 2024-03-08 DIAGNOSIS — O26.899 RH NEGATIVE, ANTEPARTUM: ICD-10-CM

## 2024-03-08 DIAGNOSIS — E87.6 HYPOKALEMIA: ICD-10-CM

## 2024-03-08 DIAGNOSIS — Z3A.30 30 WEEKS GESTATION OF PREGNANCY: Primary | ICD-10-CM

## 2024-03-08 DIAGNOSIS — O99.810 ABNORMAL GLUCOSE IN PREGNANCY, ANTEPARTUM: ICD-10-CM

## 2024-03-08 LAB
GLUCOSE UR STRIP-MCNC: NEGATIVE MG/DL
PROT UR STRIP-MCNC: ABNORMAL MG/DL

## 2024-03-08 NOTE — PROGRESS NOTES
Chief Complaint   Patient presents with    Routine Prenatal Visit      Lara Tijerina is a 24 y.o.  at 30w4d  here for routine OB visit  Reports that she was having a lot of congestion and a lot of nausea when she went to [place]. She tried mucinex and some inhaler at OSH and felt like it wasn't helping. When she came to [facility] they thought they found her mom who was missing so she had to leave. Aware K was low.  Congestion has improved and her appetite has improved.   She reports a lot of fatigue at her job and at home.  Feels that she is having trouble dong her job.  She reports that her boss is understanding and she would like a note that states that she is not going to continue working    /70   Wt 70 kg (154 lb 6.4 oz)   LMP  (LMP Unknown) Comment: pt states July, her periods are irregular  BMI 25.69 kg/m²        Gen: NAD, well appearing  Abd: nontender      See OB Flowsheet    ASSESSMENT/PLAN:  Diagnoses and all orders for this visit:    1. 30 weeks gestation of pregnancy (Primary)  -     POC Urinalysis Dipstick  -     T Pallidum Antibody w/ reflex RPR    2. Abnormal glucose in pregnancy, antepartum  -     US Ob Follow Up Transabdominal Approach; Future    3. Rh negative, antepartum    4. Hypokalemia  -     Comprehensive Metabolic Panel    Reviewed K. If still low, may need to replace with or without cardiac monitoring  Discussed leave from work  Repeat labs ordered as well as syphilis testing  Counseled on risks/benefits of bilateral tubal ligation.  She was counseled that this should be considered a permanent procedure.  Although there are surgeries to reverse this, there not 100% successful.  She is adamant that she desires no further childbearing.  We discussed that bilateral tubal ligations usually do not change menstrual cycles, so some women are on hormonal therapy to control bleeding or regularly periods.  We discussed that it also does not prevent sexually transmitted infections and so condoms  are recommended to prevent transmission of these diseases.  We discussed other options for contraception including LARCs.  Patient was counseled that there is a risk of failure and if the tubal ligation were to fail, there is an increased risk of ectopic pregnancy.  She was counseled that she missed a period she should take a pregnancy test and if positive be seen by a physician immediately.  We reviewed the risk of regret and desire for future childbearing.  We discussed that this risk is higher in women who have fewer children or or at a younger age.    We discussed methods for tubal ligation/sterilization. We reviewed option for tubal interruption with Filshie clips or electrocautery versus bilateral salpingectomy (removal of fallopian tubes).  If feasible patient elects for bilateral salpingectomy. She is aware that if this is not feasible a reasonable effort will be made to perform a safe sterilization by another method at the time of the surgery.    Patient had all questions answered at the end of this discussion.  She will sign tubal papers at the .   Routine counseling pertinent to this stage of pregnancy was reviewed   Return in about 2 weeks (around 3/22/2024) for growth US, OB visit.

## 2024-03-09 LAB
ALBUMIN SERPL-MCNC: 3.6 G/DL (ref 4–5)
ALBUMIN/GLOB SERPL: 1.3 {RATIO} (ref 1.2–2.2)
ALP SERPL-CCNC: 169 IU/L (ref 44–121)
ALT SERPL-CCNC: 7 IU/L (ref 0–32)
AST SERPL-CCNC: 11 IU/L (ref 0–40)
BILIRUB SERPL-MCNC: 0.4 MG/DL (ref 0–1.2)
BUN SERPL-MCNC: 4 MG/DL (ref 6–20)
BUN/CREAT SERPL: 6 (ref 9–23)
CALCIUM SERPL-MCNC: 8.4 MG/DL (ref 8.7–10.2)
CHLORIDE SERPL-SCNC: 103 MMOL/L (ref 96–106)
CO2 SERPL-SCNC: 19 MMOL/L (ref 20–29)
CREAT SERPL-MCNC: 0.71 MG/DL (ref 0.57–1)
EGFRCR SERPLBLD CKD-EPI 2021: 120 ML/MIN/1.73
GLOBULIN SER CALC-MCNC: 2.7 G/DL (ref 1.5–4.5)
GLUCOSE SERPL-MCNC: 66 MG/DL (ref 70–99)
POTASSIUM SERPL-SCNC: 3.6 MMOL/L (ref 3.5–5.2)
PROT SERPL-MCNC: 6.3 G/DL (ref 6–8.5)
SODIUM SERPL-SCNC: 139 MMOL/L (ref 134–144)

## 2024-03-11 LAB — T PALLIDUM AB SER QL IF: NON REACTIVE

## 2024-03-22 ENCOUNTER — ROUTINE PRENATAL (OUTPATIENT)
Dept: OBSTETRICS AND GYNECOLOGY | Facility: CLINIC | Age: 24
End: 2024-03-22
Payer: COMMERCIAL

## 2024-03-22 VITALS — WEIGHT: 160 LBS | BODY MASS INDEX: 26.63 KG/M2 | SYSTOLIC BLOOD PRESSURE: 119 MMHG | DIASTOLIC BLOOD PRESSURE: 65 MMHG

## 2024-03-22 DIAGNOSIS — Z3A.32 32 WEEKS GESTATION OF PREGNANCY: ICD-10-CM

## 2024-03-22 DIAGNOSIS — Z34.93 THIRD TRIMESTER PREGNANCY: Primary | ICD-10-CM

## 2024-03-22 LAB
GLUCOSE UR STRIP-MCNC: NEGATIVE MG/DL
LEUKOCYTE EST, POC: ABNORMAL
PROT UR STRIP-MCNC: ABNORMAL MG/DL

## 2024-03-22 PROCEDURE — 99213 OFFICE O/P EST LOW 20 MIN: CPT

## 2024-03-22 NOTE — PROGRESS NOTES
OB VISIT 32 WEEKS      Chief Complaint   Patient presents with    Routine Prenatal Visit         Lara is a 24 y.o.  32w4d being seen today for her obstetrical visit.  Patient reports no complaints. Fetal movement: normal. The patient currently sees Dr Verdin.       REVIEW OF SYSTEMS  Cramping/contractions: denies  Vaginal bleeding: denies  Fetal movement: present  Leaking of fluid: denies    Review of Systems   Eyes:  Negative for blurred vision, double vision and visual disturbance.   Gastrointestinal:  Negative for abdominal pain.   Neurological:  Negative for light-headedness and headache.   All other systems reviewed and are negative.      /65   Wt 72.6 kg (160 lb)   LMP  (LMP Unknown) Comment: pt states July, her periods are irregular  BMI 26.63 kg/m²     Prenatal Assessment  Fetal Heart Rate: 141  Movement: Present  Presentation: Vertex  Edema  LLE Edema: None  RLE Edema: None  Facial Edema: None    Physical Exam  Constitutional:       General: She is awake.      Appearance: Normal appearance. She is well-developed and well-groomed.   HENT:      Head: Normocephalic and atraumatic.   Pulmonary:      Effort: Pulmonary effort is normal.   Musculoskeletal:      Cervical back: Normal range of motion.   Neurological:      General: No focal deficit present.      Mental Status: She is alert and oriented to person, place, and time.   Skin:     General: Skin is warm and dry.   Psychiatric:         Mood and Affect: Mood normal.         Behavior: Behavior normal. Behavior is cooperative.   Vitals reviewed.         ASSESSMENT/PLAN    Diagnoses and all orders for this visit:    1. Third trimester pregnancy (Primary)  -     POC Urinalysis Dipstick  -     Urine Culture - Urine, Urine, Clean Catch    2. 32 weeks gestation of pregnancy  -     POC Urinalysis Dipstick  -     Urine Culture - Urine, Urine, Clean Catch         Urine dip today: positive protein, negative glucose. Encouraged water intake throughout  the day.   Discussed pediatrician choice: peditrician choice - will use the pediatrician her other children see.    Reviewed this stage of pregnancy.  Problem list updated.     Follow up in 2 weeks with Dr. Verdin.     I spent 15 minutes caring for Lara on this date of service. This time includes time spent by me in the following activities: preparing for the visit, reviewing tests, obtaining and/or reviewing a separately obtained history, performing a medically appropriate examination and/or evaluation, counseling and educating the patient/family/caregiver, ordering medications, tests, or procedures, referring and communicating with other health care professionals, documenting information in the medical record, independently interpreting results and communicating that information with the patient/family/caregiver, and care coordination.    Nikki Hill CNM  3/25/2024  10:40 EDT         GROWTH ULTRASOUND PRELIMINARY RESULT   3/22/2024  32w4d    INDICATION: for fetal growth, abnormal glucose  BPD: 8.07 cm  HC: 29.92 cm  AC: 27.37 cm  FL: 6.29 cm  OVERALL: 37.2%  EFW: 1889 g, 4lb3oz  ANNETTA: 16.19 cm  PRESENTATION: vertex  PLACENTA: anterior  INTERVAL GROWTH: appropriate, growth appropriate for gestational age.  CONSISTENT WITH DATES: yes   COMPARATIVE DATA: not available for examination.  Nikki Hill CNM  3/22/2024  10:57 EDT

## 2024-03-24 LAB
BACTERIA UR CULT: NORMAL
BACTERIA UR CULT: NORMAL

## 2024-04-05 ENCOUNTER — ROUTINE PRENATAL (OUTPATIENT)
Dept: OBSTETRICS AND GYNECOLOGY | Facility: CLINIC | Age: 24
End: 2024-04-05
Payer: COMMERCIAL

## 2024-04-05 VITALS — WEIGHT: 163 LBS | BODY MASS INDEX: 27.12 KG/M2 | SYSTOLIC BLOOD PRESSURE: 118 MMHG | DIASTOLIC BLOOD PRESSURE: 75 MMHG

## 2024-04-05 DIAGNOSIS — Z34.93 THIRD TRIMESTER PREGNANCY: Primary | ICD-10-CM

## 2024-04-05 DIAGNOSIS — Z3A.34 34 WEEKS GESTATION OF PREGNANCY: ICD-10-CM

## 2024-04-05 LAB
GLUCOSE UR STRIP-MCNC: NEGATIVE MG/DL
PROT UR STRIP-MCNC: NEGATIVE MG/DL

## 2024-04-18 NOTE — PROGRESS NOTES
OB VISIT 36 WEEKS      Chief Complaint   Patient presents with    Routine Prenatal Visit         Lara is a 24 y.o.  36w4d being seen today for her obstetrical visit.  Patient reports that her allergies have been bothering her and she has been feeling nasal stuffiness for the past 3 days. Fetal movement: normal. The patient currently sees Dr. Verdin.       REVIEW OF SYSTEMS  Cramping/contractions: denies  Vaginal bleeding: denies  Fetal movement: present  Leaking of fluid: denies    Review of Systems   Eyes:  Negative for blurred vision, double vision and visual disturbance.   Gastrointestinal:  Negative for abdominal pain.   Neurological:  Negative for light-headedness and headache.   All other systems reviewed and are negative.      /71   Wt 73.5 kg (162 lb)   LMP  (LMP Unknown) Comment: pt states July, her periods are irregular  BMI 26.96 kg/m²     Prenatal Assessment  Fetal Heart Rate: 124  Fundal Height (cm): 36 cm  Movement: Present  Presentation: Vertex  Dilation/Effacement/Station  Dilation: Closed  Effacement (%): 0  Station: -3  Edema  LLE Edema: None  RLE Edema: None  Facial Edema: None    Physical Exam  Constitutional:       General: She is awake.      Appearance: Normal appearance. She is well-developed and well-groomed.   HENT:      Head: Normocephalic and atraumatic.   Pulmonary:      Effort: Pulmonary effort is normal.   Musculoskeletal:      Cervical back: Normal range of motion.   Neurological:      General: No focal deficit present.      Mental Status: She is alert and oriented to person, place, and time.   Skin:     General: Skin is warm and dry.   Psychiatric:         Mood and Affect: Mood normal.         Behavior: Behavior normal. Behavior is cooperative.   Vitals reviewed.         ASSESSMENT/PLAN    Diagnoses and all orders for this visit:    1. Third trimester pregnancy (Primary)  -     Strep Gp B Culture+Rfx ([facility]) - Swab, Vaginal/Rectum  -     Strep Gp B Culture+Rfx  ([facility]) - Swab, Vaginal/Rectum    2. 36 weeks gestation of pregnancy  -     Strep Gp B Culture+Rfx ([facility]) - Swab, Vaginal/Rectum  -     Strep Gp B Culture+Rfx ([facility]) - Swab, Vaginal/Rectum  -     POC Urinalysis Dipstick         Cervical Exam: performed: Closed, 0% , -3.  Urine dip today:  trace protein, negative glucose. Encouraged adequate water intake throughout the day.   GBS swab collected and sent today.    Dilation tips given to patient via AVS.  Reviewed fetal kick counts  Reviewed this stage of pregnancy  Problem list updated   Is aware of hospital location, when to go in.    Follow up in 1 week with Dr. Verdin.     I spent 15 minutes caring for Lara on this date of service. This time includes time spent by me in the following activities: preparing for the visit, reviewing tests, obtaining and/or reviewing a separately obtained history, performing a medically appropriate examination and/or evaluation, counseling and educating the patient/family/caregiver, ordering medications, tests, or procedures, referring and communicating with other health care professionals, documenting information in the medical record, independently interpreting results and communicating that information with the patient/family/caregiver, and care coordination.    Nikki Hill CNM  4/19/2024  13:20 EDT

## 2024-04-18 NOTE — PATIENT INSTRUCTIONS
TIPS FOR CERVICAL DILATION    SEXUAL INTERCOURSE  Make sure you lie down for a short amount of time afterwards to make sure the semen gets to your cervix.    YOGA/BIRTHING BALL  Bouncing on the ball and rotating hips can be effective for bringing baby down into the pelvis.    EVENING PRIMROSE OIL CAPSULES  In the evening right before going to bed, poke a hole in the end of a capsule and insert it into the vagina just as you would a tampon. There is an oil inside and some may leak out so you may want to wear a pad.   Also take one capsule by mouth each evening.   CURB WALKING  Walk with one foot on the curb and one on the ground. Make sure to turn around and walk back with the other foot to keep the hips even.    Alternatively you can do walking on stairs if no curb is available.  RED RASPBERRY LEAF TEA  2 cups a day if possible (no more than 2 daily)   It will not effect it's efficacy to add sugar, honey, or artificial sweetener if needed.   DATES  Eat 6 Medjool dates per day, starting at 36 weeks.  If you do not like the consistency of dates you can try Fig Newtons.   BREAST PUMPING   15 minutes each day, but no more than that  MEMBRANE STRIPPING   Done in the office by a provider  OTHER OPTIONS  Chiropractics  Acupuncture     **Avoid taking mineral/castor oil by itself as seen in old sumanth's tales. It is associated with diarrhea and has not been found to be effective.**

## 2024-04-19 ENCOUNTER — ROUTINE PRENATAL (OUTPATIENT)
Dept: OBSTETRICS AND GYNECOLOGY | Facility: CLINIC | Age: 24
End: 2024-04-19
Payer: COMMERCIAL

## 2024-04-19 VITALS — WEIGHT: 162 LBS | BODY MASS INDEX: 26.96 KG/M2 | SYSTOLIC BLOOD PRESSURE: 106 MMHG | DIASTOLIC BLOOD PRESSURE: 71 MMHG

## 2024-04-19 DIAGNOSIS — Z34.93 THIRD TRIMESTER PREGNANCY: Primary | ICD-10-CM

## 2024-04-19 DIAGNOSIS — Z3A.36 36 WEEKS GESTATION OF PREGNANCY: ICD-10-CM

## 2024-04-23 LAB — B-HEM STREP SPEC QL CULT: NEGATIVE

## 2024-04-26 ENCOUNTER — ROUTINE PRENATAL (OUTPATIENT)
Dept: OBSTETRICS AND GYNECOLOGY | Facility: CLINIC | Age: 24
End: 2024-04-26
Payer: COMMERCIAL

## 2024-04-26 VITALS — DIASTOLIC BLOOD PRESSURE: 83 MMHG | WEIGHT: 161 LBS | BODY MASS INDEX: 26.79 KG/M2 | SYSTOLIC BLOOD PRESSURE: 128 MMHG

## 2024-04-26 DIAGNOSIS — Z34.93 THIRD TRIMESTER PREGNANCY: Primary | ICD-10-CM

## 2024-04-26 DIAGNOSIS — Z3A.37 37 WEEKS GESTATION OF PREGNANCY: ICD-10-CM

## 2024-04-26 LAB
GLUCOSE UR STRIP-MCNC: NEGATIVE MG/DL
LEUKOCYTE EST, POC: ABNORMAL
PROT UR STRIP-MCNC: NEGATIVE MG/DL

## 2024-04-26 NOTE — PROGRESS NOTES
OB VISIT 37 WEEKS      Chief Complaint   Patient presents with    Routine Prenatal Visit         Lara is a 24 y.o.  37w4d being seen today for her obstetrical visit.  Patient reports no complaints. Fetal movement: normal. The patient currently sees Dr. Verdin.       REVIEW OF SYSTEMS  Cramping/contractions: denies  Vaginal bleeding: denies  Fetal movement: present  Leaking of fluid: denies    Review of Systems   Eyes:  Negative for blurred vision, double vision and visual disturbance.   Gastrointestinal:  Negative for abdominal pain.   Neurological:  Negative for light-headedness and headache.   All other systems reviewed and are negative.      /83   Wt 73 kg (161 lb)   LMP  (LMP Unknown) Comment: pt states July, her periods are irregular  BMI 26.79 kg/m²     Prenatal Assessment  Fetal Heart Rate: 138  Fundal Height (cm): 36 cm  Movement: Present  Presentation: Vertex  Dilation/Effacement/Station  Dilation: 2  Effacement (%): 50  Station: -3  Edema  LLE Edema: None  RLE Edema: None  Facial Edema: None    Physical Exam  Constitutional:       General: She is awake.      Appearance: Normal appearance. She is well-developed and well-groomed.   HENT:      Head: Normocephalic and atraumatic.   Pulmonary:      Effort: Pulmonary effort is normal.   Musculoskeletal:      Cervical back: Normal range of motion.   Neurological:      General: No focal deficit present.      Mental Status: She is alert and oriented to person, place, and time.   Skin:     General: Skin is warm and dry.   Psychiatric:         Mood and Affect: Mood normal.         Behavior: Behavior normal. Behavior is cooperative.   Vitals reviewed.         ASSESSMENT/PLAN    Diagnoses and all orders for this visit:    1. Third trimester pregnancy (Primary)  -     POC Urinalysis Dipstick    2. 37 weeks gestation of pregnancy  -     POC Urinalysis Dipstick         Cervical Exam: performed: 2 cm, 50%, -3.    Urine dip today: negative protein,  negative glucose.   GBS results reviewed- negative.   She may want her membranes stripped at her 39 week visit.    Dilation tips given to patient previously.   Reviewed this stage of pregnancy.  Problem list updated.   Is aware of hospital location, when to go in.    Follow up in 1 week with Dr. Verdin.     I spent 15 minutes caring for Lara on this date of service. This time includes time spent by me in the following activities: preparing for the visit, reviewing tests, obtaining and/or reviewing a separately obtained history, performing a medically appropriate examination and/or evaluation, counseling and educating the patient/family/caregiver, ordering medications, tests, or procedures, referring and communicating with other health care professionals, documenting information in the medical record, independently interpreting results and communicating that information with the patient/family/caregiver, and care coordination.    Nikki Hill CNM  4/26/2024  13:23 EDT  OB37

## 2024-04-29 ENCOUNTER — TELEPHONE (OUTPATIENT)
Dept: OBSTETRICS AND GYNECOLOGY | Facility: CLINIC | Age: 24
End: 2024-04-29
Payer: COMMERCIAL

## 2024-04-29 NOTE — TELEPHONE ENCOUNTER
Caller: Lara Tijerina    Relationship to patient: Self    Best call back number: [phone]    Patient is needing: PATIENT CALLED AND STATED THAT HER GRANDMOTHER PASSED AWAY YESTERDAY IN [place] BUT THE  WOULD BE IN [place] AND THE PATIENT WOULD BE PRIMARILY RESPONSIBLE FOR THE ARRANGEMENTS    PATIENT WOULD LIKE TO KNOW IF IT IS SAFE FOR HER TO TRAVEL, SPECIFICALLY TO FLY TO [place], AT THIS POINT IN HER PREGNANCY AS SHE IS CURRENTLY 38 WEEKS TODAY AND WAS TOLD SHE WAS ALREADY 2CM DILATED AT HER LAST APPT ON 24

## 2024-04-30 ENCOUNTER — ANESTHESIA (OUTPATIENT)
Dept: LABOR AND DELIVERY | Facility: HOSPITAL | Age: 24
End: 2024-04-30
Payer: COMMERCIAL

## 2024-04-30 ENCOUNTER — HOSPITAL ENCOUNTER (INPATIENT)
Facility: HOSPITAL | Age: 24
LOS: 2 days | Discharge: HOME | End: 2024-05-02
Attending: OBSTETRICS & GYNECOLOGY | Admitting: OBSTETRICS & GYNECOLOGY
Payer: COMMERCIAL

## 2024-04-30 ENCOUNTER — ANESTHESIA EVENT (OUTPATIENT)
Dept: LABOR AND DELIVERY | Facility: HOSPITAL | Age: 24
End: 2024-04-30
Payer: COMMERCIAL

## 2024-04-30 ENCOUNTER — ROUTINE PRENATAL (OUTPATIENT)
Dept: OBSTETRICS AND GYNECOLOGY | Facility: CLINIC | Age: 24
End: 2024-04-30
Payer: COMMERCIAL

## 2024-04-30 VITALS — WEIGHT: 162 LBS | DIASTOLIC BLOOD PRESSURE: 79 MMHG | BODY MASS INDEX: 26.96 KG/M2 | SYSTOLIC BLOOD PRESSURE: 117 MMHG

## 2024-04-30 DIAGNOSIS — Z34.80 SUPERVISION OF OTHER NORMAL PREGNANCY, ANTEPARTUM: ICD-10-CM

## 2024-04-30 DIAGNOSIS — Z3A.38 38 WEEKS GESTATION OF PREGNANCY: Primary | ICD-10-CM

## 2024-04-30 PROBLEM — Z34.90 PREGNANT: Status: ACTIVE | Noted: 2024-04-30

## 2024-04-30 LAB
BASOPHILS # BLD AUTO: 0.06 10*3/MM3 (ref 0–0.2)
BASOPHILS NFR BLD AUTO: 0.3 % (ref 0–1.5)
DEPRECATED RDW RBC AUTO: 45.7 FL (ref 37–54)
EOSINOPHIL # BLD AUTO: 0.08 10*3/MM3 (ref 0–0.4)
EOSINOPHIL NFR BLD AUTO: 0.4 % (ref 0.3–6.2)
ERYTHROCYTE [DISTWIDTH] IN BLOOD BY AUTOMATED COUNT: 14 % (ref 12.3–15.4)
GLUCOSE UR STRIP-MCNC: NEGATIVE MG/DL
HCT VFR BLD AUTO: 36.8 % (ref 34–46.6)
HGB BLD-MCNC: 12.6 G/DL (ref 12–15.9)
IMM GRANULOCYTES # BLD AUTO: 0.12 10*3/MM3 (ref 0–0.05)
IMM GRANULOCYTES NFR BLD AUTO: 0.7 % (ref 0–0.5)
LYMPHOCYTES # BLD AUTO: 2.75 10*3/MM3 (ref 0.7–3.1)
LYMPHOCYTES NFR BLD AUTO: 15 % (ref 19.6–45.3)
MCH RBC QN AUTO: 30.4 PG (ref 26.6–33)
MCHC RBC AUTO-ENTMCNC: 34.2 G/DL (ref 31.5–35.7)
MCV RBC AUTO: 88.7 FL (ref 79–97)
MONOCYTES # BLD AUTO: 0.99 10*3/MM3 (ref 0.1–0.9)
MONOCYTES NFR BLD AUTO: 5.4 % (ref 5–12)
NEUTROPHILS NFR BLD AUTO: 14.36 10*3/MM3 (ref 1.7–7)
NEUTROPHILS NFR BLD AUTO: 78.2 % (ref 42.7–76)
NRBC BLD AUTO-RTO: 0 /100 WBC (ref 0–0.2)
PLATELET # BLD AUTO: 301 10*3/MM3 (ref 140–450)
PMV BLD AUTO: 9.3 FL (ref 6–12)
PROT UR STRIP-MCNC: ABNORMAL MG/DL
RBC # BLD AUTO: 4.15 10*6/MM3 (ref 3.77–5.28)
WBC NRBC COR # BLD AUTO: 18.36 10*3/MM3 (ref 3.4–10.8)

## 2024-04-30 PROCEDURE — 25810000003 LACTATED RINGERS SOLUTION: Performed by: OBSTETRICS & GYNECOLOGY

## 2024-04-30 PROCEDURE — 85025 COMPLETE CBC W/AUTO DIFF WBC: CPT | Performed by: OBSTETRICS & GYNECOLOGY

## 2024-04-30 PROCEDURE — 86901 BLOOD TYPING SEROLOGIC RH(D): CPT | Performed by: OBSTETRICS & GYNECOLOGY

## 2024-04-30 PROCEDURE — 86780 TREPONEMA PALLIDUM: CPT | Performed by: OBSTETRICS & GYNECOLOGY

## 2024-04-30 PROCEDURE — 86900 BLOOD TYPING SEROLOGIC ABO: CPT | Performed by: OBSTETRICS & GYNECOLOGY

## 2024-04-30 PROCEDURE — 86850 RBC ANTIBODY SCREEN: CPT | Performed by: OBSTETRICS & GYNECOLOGY

## 2024-04-30 PROCEDURE — 80053 COMPREHEN METABOLIC PANEL: CPT | Performed by: OBSTETRICS & GYNECOLOGY

## 2024-04-30 PROCEDURE — 86920 COMPATIBILITY TEST SPIN: CPT

## 2024-04-30 PROCEDURE — 99202 OFFICE O/P NEW SF 15 MIN: CPT | Performed by: OBSTETRICS & GYNECOLOGY

## 2024-04-30 PROCEDURE — 99213 OFFICE O/P EST LOW 20 MIN: CPT | Performed by: OBSTETRICS & GYNECOLOGY

## 2024-04-30 PROCEDURE — 86870 RBC ANTIBODY IDENTIFICATION: CPT | Performed by: OBSTETRICS & GYNECOLOGY

## 2024-04-30 RX ORDER — FAMOTIDINE 20 MG/1
20 TABLET, FILM COATED ORAL 2 TIMES DAILY PRN
Status: DISCONTINUED | OUTPATIENT
Start: 2024-04-30 | End: 2024-05-01 | Stop reason: HOSPADM

## 2024-04-30 RX ORDER — DIPHENHYDRAMINE HYDROCHLORIDE 50 MG/ML
12.5 INJECTION INTRAMUSCULAR; INTRAVENOUS EVERY 8 HOURS PRN
Status: DISCONTINUED | OUTPATIENT
Start: 2024-04-30 | End: 2024-05-01 | Stop reason: HOSPADM

## 2024-04-30 RX ORDER — FAMOTIDINE 10 MG/ML
20 INJECTION, SOLUTION INTRAVENOUS ONCE AS NEEDED
Status: DISCONTINUED | OUTPATIENT
Start: 2024-04-30 | End: 2024-05-01 | Stop reason: HOSPADM

## 2024-04-30 RX ORDER — SODIUM CHLORIDE 9 MG/ML
40 INJECTION, SOLUTION INTRAVENOUS AS NEEDED
Status: DISCONTINUED | OUTPATIENT
Start: 2024-04-30 | End: 2024-05-01 | Stop reason: HOSPADM

## 2024-04-30 RX ORDER — ONDANSETRON 2 MG/ML
4 INJECTION INTRAMUSCULAR; INTRAVENOUS EVERY 6 HOURS PRN
Status: DISCONTINUED | OUTPATIENT
Start: 2024-04-30 | End: 2024-05-01 | Stop reason: HOSPADM

## 2024-04-30 RX ORDER — TERBUTALINE SULFATE 1 MG/ML
0.25 INJECTION, SOLUTION SUBCUTANEOUS AS NEEDED
Status: DISCONTINUED | OUTPATIENT
Start: 2024-04-30 | End: 2024-05-01 | Stop reason: HOSPADM

## 2024-04-30 RX ORDER — ONDANSETRON 2 MG/ML
4 INJECTION INTRAMUSCULAR; INTRAVENOUS ONCE AS NEEDED
Status: DISCONTINUED | OUTPATIENT
Start: 2024-04-30 | End: 2024-05-01 | Stop reason: HOSPADM

## 2024-04-30 RX ORDER — EPHEDRINE SULFATE 50 MG/ML
5 INJECTION, SOLUTION INTRAVENOUS
Status: DISCONTINUED | OUTPATIENT
Start: 2024-04-30 | End: 2024-05-01 | Stop reason: HOSPADM

## 2024-04-30 RX ORDER — LIDOCAINE HYDROCHLORIDE 10 MG/ML
0.5 INJECTION, SOLUTION INFILTRATION; PERINEURAL ONCE AS NEEDED
Status: DISCONTINUED | OUTPATIENT
Start: 2024-04-30 | End: 2024-05-01 | Stop reason: HOSPADM

## 2024-04-30 RX ORDER — SODIUM CHLORIDE, SODIUM LACTATE, POTASSIUM CHLORIDE, CALCIUM CHLORIDE 600; 310; 30; 20 MG/100ML; MG/100ML; MG/100ML; MG/100ML
125 INJECTION, SOLUTION INTRAVENOUS CONTINUOUS
Status: DISCONTINUED | OUTPATIENT
Start: 2024-05-01 | End: 2024-05-01

## 2024-04-30 RX ORDER — ONDANSETRON 4 MG/1
4 TABLET, ORALLY DISINTEGRATING ORAL EVERY 6 HOURS PRN
Status: DISCONTINUED | OUTPATIENT
Start: 2024-04-30 | End: 2024-05-01 | Stop reason: HOSPADM

## 2024-04-30 RX ORDER — FAMOTIDINE 10 MG/ML
20 INJECTION, SOLUTION INTRAVENOUS 2 TIMES DAILY PRN
Status: DISCONTINUED | OUTPATIENT
Start: 2024-04-30 | End: 2024-05-01 | Stop reason: HOSPADM

## 2024-04-30 RX ORDER — SODIUM CHLORIDE 0.9 % (FLUSH) 0.9 %
10 SYRINGE (ML) INJECTION EVERY 12 HOURS SCHEDULED
Status: DISCONTINUED | OUTPATIENT
Start: 2024-05-01 | End: 2024-05-01 | Stop reason: HOSPADM

## 2024-04-30 RX ORDER — FENTANYL/ROPIVACAINE/NS/PF 2MCG/ML-.2
10 PLASTIC BAG, INJECTION (ML) INJECTION CONTINUOUS
Status: DISCONTINUED | OUTPATIENT
Start: 2024-05-01 | End: 2024-05-01

## 2024-04-30 RX ORDER — ACETAMINOPHEN 325 MG/1
650 TABLET ORAL EVERY 4 HOURS PRN
Status: DISCONTINUED | OUTPATIENT
Start: 2024-04-30 | End: 2024-05-01 | Stop reason: HOSPADM

## 2024-04-30 RX ORDER — SODIUM CHLORIDE 0.9 % (FLUSH) 0.9 %
10 SYRINGE (ML) INJECTION AS NEEDED
Status: DISCONTINUED | OUTPATIENT
Start: 2024-04-30 | End: 2024-05-01 | Stop reason: HOSPADM

## 2024-04-30 RX ORDER — MORPHINE SULFATE 2 MG/ML
2 INJECTION, SOLUTION INTRAMUSCULAR; INTRAVENOUS EVERY 4 HOURS PRN
Status: DISCONTINUED | OUTPATIENT
Start: 2024-04-30 | End: 2024-05-01 | Stop reason: HOSPADM

## 2024-04-30 RX ORDER — NALOXONE HCL 0.4 MG/ML
0.4 VIAL (ML) INJECTION
Status: DISCONTINUED | OUTPATIENT
Start: 2024-04-30 | End: 2024-05-01 | Stop reason: HOSPADM

## 2024-04-30 RX ORDER — MAGNESIUM CARB/ALUMINUM HYDROX 105-160MG
30 TABLET,CHEWABLE ORAL ONCE AS NEEDED
Status: DISCONTINUED | OUTPATIENT
Start: 2024-04-30 | End: 2024-05-01 | Stop reason: HOSPADM

## 2024-04-30 RX ADMIN — SODIUM CHLORIDE, POTASSIUM CHLORIDE, SODIUM LACTATE AND CALCIUM CHLORIDE 1000 ML: 600; 310; 30; 20 INJECTION, SOLUTION INTRAVENOUS at 23:36

## 2024-04-30 NOTE — TELEPHONE ENCOUNTER
Vandana-  Please let Lara know I am sorry for her loss.  I do think it would be risky to drive to [place] this close to her due date.  We can check her cervix tomorrow and discuss more or if she needs to be seen today that is fine to offer her an appointment today. Thanks!

## 2024-04-30 NOTE — PROGRESS NOTES
Chief Complaint   Patient presents with    Routine Prenatal Visit     38 weeks        Lara Tijerina is a 24 y.o.  at 38w1d  here for routine OB visit  Reports that her grandmother recently passed away; she is in charge of the arrangements which will be in [place] and does not feel like traveling is in her best interest this close to her due date.  She has felt some back pain, no vaginal bleeding or LOF. Notes normal fetal movements  /79   Wt 73.5 kg (162 lb)   LMP  (LMP Unknown) Comment: pt states July, her periods are irregular  BMI 26.96 kg/m²        Gen: NAD, well appearing  Abd: nontender  Pelvic: nl ext genitalia    See OB Flowsheet    ASSESSMENT/PLAN:  Diagnoses and all orders for this visit:    1. 38 weeks gestation of pregnancy (Primary)  -     POC Urinalysis Dipstick    2. Supervision of other normal pregnancy, antepartum    Reviewed that I agree with her concern about travel at this stage. She plans to delay the arrangements until postpartum and we reviewed travel postpartum, decreasing risks of bleeding/blood clots  If not in spontaneous labor, would like an induction next week. Will schedule  Routine counseling pertinent to this stage of pregnancy was reviewed including labor and fetal movement precautions  Return in about 1 week (around 2024).

## 2024-05-01 LAB
ABO GROUP BLD: NORMAL
ALBUMIN SERPL-MCNC: 3.5 G/DL (ref 3.5–5.2)
ALBUMIN/GLOB SERPL: 1.1 G/DL
ALP SERPL-CCNC: 211 U/L (ref 39–117)
ALT SERPL W P-5'-P-CCNC: <5 U/L (ref 1–33)
AMPHET+METHAMPHET UR QL: NEGATIVE
ANION GAP SERPL CALCULATED.3IONS-SCNC: 11 MMOL/L (ref 5–15)
AST SERPL-CCNC: 9 U/L (ref 1–32)
BARBITURATES UR QL SCN: NEGATIVE
BENZODIAZ UR QL SCN: NEGATIVE
BILIRUB SERPL-MCNC: 0.3 MG/DL (ref 0–1.2)
BILIRUB UR QL STRIP: NEGATIVE
BLD GP AB SCN SERPL QL: POSITIVE
BUN SERPL-MCNC: 3 MG/DL (ref 6–20)
BUN/CREAT SERPL: 3.9 (ref 7–25)
CALCIUM SPEC-SCNC: 9.4 MG/DL (ref 8.6–10.5)
CANNABINOIDS SERPL QL: NEGATIVE
CHLORIDE SERPL-SCNC: 105 MMOL/L (ref 98–107)
CLARITY UR: ABNORMAL
CO2 SERPL-SCNC: 20 MMOL/L (ref 22–29)
COCAINE UR QL: NEGATIVE
COLOR UR: YELLOW
CREAT SERPL-MCNC: 0.76 MG/DL (ref 0.57–1)
EGFRCR SERPLBLD CKD-EPI 2021: 111 ML/MIN/1.73
FENTANYL UR-MCNC: NEGATIVE NG/ML
GLOBULIN UR ELPH-MCNC: 3.1 GM/DL
GLUCOSE SERPL-MCNC: 92 MG/DL (ref 65–99)
GLUCOSE UR STRIP-MCNC: NEGATIVE MG/DL
HGB UR QL STRIP.AUTO: NEGATIVE
KETONES UR QL STRIP: NEGATIVE
LEUKOCYTE ESTERASE UR QL STRIP.AUTO: NEGATIVE
MAGNESIUM SERPL-MCNC: 1.5 MG/DL (ref 1.6–2.6)
METHADONE UR QL SCN: NEGATIVE
NITRITE UR QL STRIP: NEGATIVE
OPIATES UR QL: NEGATIVE
OXYCODONE UR QL SCN: NEGATIVE
PH UR STRIP.AUTO: 7 [PH] (ref 5–8)
POTASSIUM SERPL-SCNC: 2.7 MMOL/L (ref 3.5–5.2)
POTASSIUM SERPL-SCNC: 3.1 MMOL/L (ref 3.5–5.2)
POTASSIUM SERPL-SCNC: 3.4 MMOL/L (ref 3.5–5.2)
PROT SERPL-MCNC: 6.6 G/DL (ref 6–8.5)
PROT UR QL STRIP: ABNORMAL
RESIDUAL RHIG DETECTED: NORMAL
RH BLD: NEGATIVE
SODIUM SERPL-SCNC: 136 MMOL/L (ref 136–145)
SP GR UR STRIP: 1.01 (ref 1–1.03)
T PALLIDUM IGG SER QL: NORMAL
T&S EXPIRATION DATE: NORMAL
UROBILINOGEN UR QL STRIP: ABNORMAL
WHOLE BLOOD HOLD SPECIMEN: NORMAL

## 2024-05-01 PROCEDURE — 81003 URINALYSIS AUTO W/O SCOPE: CPT | Performed by: OBSTETRICS & GYNECOLOGY

## 2024-05-01 PROCEDURE — 80307 DRUG TEST PRSMV CHEM ANLYZR: CPT | Performed by: OBSTETRICS & GYNECOLOGY

## 2024-05-01 PROCEDURE — 84132 ASSAY OF SERUM POTASSIUM: CPT | Performed by: OBSTETRICS & GYNECOLOGY

## 2024-05-01 PROCEDURE — 59410 OBSTETRICAL CARE: CPT | Performed by: OBSTETRICS & GYNECOLOGY

## 2024-05-01 PROCEDURE — 0503F POSTPARTUM CARE VISIT: CPT

## 2024-05-01 PROCEDURE — 87086 URINE CULTURE/COLONY COUNT: CPT | Performed by: OBSTETRICS & GYNECOLOGY

## 2024-05-01 PROCEDURE — 83735 ASSAY OF MAGNESIUM: CPT | Performed by: OBSTETRICS & GYNECOLOGY

## 2024-05-01 PROCEDURE — 25810000003 LACTATED RINGERS PER 1000 ML: Performed by: OBSTETRICS & GYNECOLOGY

## 2024-05-01 PROCEDURE — C1755 CATHETER, INTRASPINAL: HCPCS | Performed by: ANESTHESIOLOGY

## 2024-05-01 PROCEDURE — 25010000002 POTASSIUM CHLORIDE 10 MEQ/100ML SOLUTION: Performed by: OBSTETRICS & GYNECOLOGY

## 2024-05-01 RX ORDER — ONDANSETRON 2 MG/ML
4 INJECTION INTRAMUSCULAR; INTRAVENOUS EVERY 6 HOURS PRN
Status: DISCONTINUED | OUTPATIENT
Start: 2024-05-01 | End: 2024-05-02 | Stop reason: HOSPADM

## 2024-05-01 RX ORDER — CARBOPROST TROMETHAMINE 250 UG/ML
250 INJECTION, SOLUTION INTRAMUSCULAR
Status: DISCONTINUED | OUTPATIENT
Start: 2024-05-01 | End: 2024-05-01 | Stop reason: HOSPADM

## 2024-05-01 RX ORDER — PHYTONADIONE 1 MG/.5ML
INJECTION, EMULSION INTRAMUSCULAR; INTRAVENOUS; SUBCUTANEOUS
Status: ACTIVE
Start: 2024-05-01 | End: 2024-05-01

## 2024-05-01 RX ORDER — BISACODYL 10 MG
10 SUPPOSITORY, RECTAL RECTAL DAILY PRN
Status: DISCONTINUED | OUTPATIENT
Start: 2024-05-02 | End: 2024-05-02 | Stop reason: HOSPADM

## 2024-05-01 RX ORDER — POTASSIUM CHLORIDE 7.45 MG/ML
10 INJECTION INTRAVENOUS
Status: DISPENSED | OUTPATIENT
Start: 2024-05-01 | End: 2024-05-01

## 2024-05-01 RX ORDER — PROMETHAZINE HYDROCHLORIDE 12.5 MG/1
12.5 SUPPOSITORY RECTAL EVERY 6 HOURS PRN
Status: DISCONTINUED | OUTPATIENT
Start: 2024-05-01 | End: 2024-05-02 | Stop reason: HOSPADM

## 2024-05-01 RX ORDER — DOCUSATE SODIUM 100 MG/1
100 CAPSULE, LIQUID FILLED ORAL 2 TIMES DAILY
Status: DISCONTINUED | OUTPATIENT
Start: 2024-05-01 | End: 2024-05-02 | Stop reason: HOSPADM

## 2024-05-01 RX ORDER — OXYTOCIN/0.9 % SODIUM CHLORIDE 30/500 ML
125 PLASTIC BAG, INJECTION (ML) INTRAVENOUS ONCE AS NEEDED
Status: COMPLETED | OUTPATIENT
Start: 2024-05-01 | End: 2024-05-01

## 2024-05-01 RX ORDER — IBUPROFEN 600 MG/1
600 TABLET ORAL EVERY 6 HOURS PRN
Status: DISCONTINUED | OUTPATIENT
Start: 2024-05-01 | End: 2024-05-02 | Stop reason: HOSPADM

## 2024-05-01 RX ORDER — ONDANSETRON 4 MG/1
4 TABLET, ORALLY DISINTEGRATING ORAL EVERY 6 HOURS PRN
Status: DISCONTINUED | OUTPATIENT
Start: 2024-05-01 | End: 2024-05-02 | Stop reason: HOSPADM

## 2024-05-01 RX ORDER — ERYTHROMYCIN 5 MG/G
OINTMENT OPHTHALMIC
Status: ACTIVE
Start: 2024-05-01 | End: 2024-05-01

## 2024-05-01 RX ORDER — OXYTOCIN/0.9 % SODIUM CHLORIDE 30/500 ML
250 PLASTIC BAG, INJECTION (ML) INTRAVENOUS CONTINUOUS
Status: DISPENSED | OUTPATIENT
Start: 2024-05-01 | End: 2024-05-01

## 2024-05-01 RX ORDER — POTASSIUM CHLORIDE 750 MG/1
10 TABLET, FILM COATED, EXTENDED RELEASE ORAL DAILY
Qty: 1 TABLET | Refills: 0 | Status: COMPLETED | OUTPATIENT
Start: 2024-05-01 | End: 2024-05-01

## 2024-05-01 RX ORDER — TRANEXAMIC ACID 10 MG/ML
1000 INJECTION, SOLUTION INTRAVENOUS ONCE AS NEEDED
Status: DISCONTINUED | OUTPATIENT
Start: 2024-05-01 | End: 2024-05-01

## 2024-05-01 RX ORDER — SODIUM CHLORIDE 0.9 % (FLUSH) 0.9 %
1-10 SYRINGE (ML) INJECTION AS NEEDED
Status: DISCONTINUED | OUTPATIENT
Start: 2024-05-01 | End: 2024-05-02 | Stop reason: HOSPADM

## 2024-05-01 RX ORDER — OXYTOCIN/0.9 % SODIUM CHLORIDE 30/500 ML
999 PLASTIC BAG, INJECTION (ML) INTRAVENOUS ONCE
Status: COMPLETED | OUTPATIENT
Start: 2024-05-01 | End: 2024-05-01

## 2024-05-01 RX ORDER — TRAMADOL HYDROCHLORIDE 50 MG/1
50 TABLET ORAL EVERY 6 HOURS PRN
Status: DISCONTINUED | OUTPATIENT
Start: 2024-05-01 | End: 2024-05-02 | Stop reason: HOSPADM

## 2024-05-01 RX ORDER — POTASSIUM CHLORIDE 750 MG/1
40 TABLET, FILM COATED, EXTENDED RELEASE ORAL 2 TIMES DAILY
Qty: 8 TABLET | Refills: 0 | Status: COMPLETED | OUTPATIENT
Start: 2024-05-01 | End: 2024-05-01

## 2024-05-01 RX ORDER — ACETAMINOPHEN 325 MG/1
650 TABLET ORAL EVERY 6 HOURS PRN
Status: DISCONTINUED | OUTPATIENT
Start: 2024-05-01 | End: 2024-05-02 | Stop reason: HOSPADM

## 2024-05-01 RX ORDER — CALCIUM CARBONATE 500 MG/1
2 TABLET, CHEWABLE ORAL 3 TIMES DAILY PRN
Status: DISCONTINUED | OUTPATIENT
Start: 2024-05-01 | End: 2024-05-01

## 2024-05-01 RX ORDER — PRENATAL VIT/IRON FUM/FOLIC AC 27MG-0.8MG
1 TABLET ORAL DAILY
Status: DISCONTINUED | OUTPATIENT
Start: 2024-05-01 | End: 2024-05-02 | Stop reason: HOSPADM

## 2024-05-01 RX ORDER — METHYLERGONOVINE MALEATE 0.2 MG/ML
200 INJECTION INTRAVENOUS ONCE AS NEEDED
Status: DISCONTINUED | OUTPATIENT
Start: 2024-05-01 | End: 2024-05-01 | Stop reason: HOSPADM

## 2024-05-01 RX ORDER — PROMETHAZINE HYDROCHLORIDE 25 MG/1
25 TABLET ORAL EVERY 6 HOURS PRN
Status: DISCONTINUED | OUTPATIENT
Start: 2024-05-01 | End: 2024-05-02 | Stop reason: HOSPADM

## 2024-05-01 RX ORDER — MISOPROSTOL 200 UG/1
800 TABLET ORAL ONCE AS NEEDED
Status: DISCONTINUED | OUTPATIENT
Start: 2024-05-01 | End: 2024-05-01 | Stop reason: HOSPADM

## 2024-05-01 RX ORDER — POTASSIUM CHLORIDE 750 MG/1
40 TABLET, FILM COATED, EXTENDED RELEASE ORAL EVERY 4 HOURS
Status: COMPLETED | OUTPATIENT
Start: 2024-05-01 | End: 2024-05-02

## 2024-05-01 RX ORDER — HYDROCORTISONE 25 MG/G
CREAM TOPICAL AS NEEDED
Status: DISCONTINUED | OUTPATIENT
Start: 2024-05-01 | End: 2024-05-02 | Stop reason: HOSPADM

## 2024-05-01 RX ADMIN — Medication: at 21:46

## 2024-05-01 RX ADMIN — ANTACID TABLETS 2 TABLET: 500 TABLET, CHEWABLE ORAL at 02:02

## 2024-05-01 RX ADMIN — Medication 999 ML/HR: at 01:40

## 2024-05-01 RX ADMIN — POTASSIUM CHLORIDE 10 MEQ: 7.46 INJECTION, SOLUTION INTRAVENOUS at 08:05

## 2024-05-01 RX ADMIN — POTASSIUM CHLORIDE 40 MEQ: 750 TABLET, EXTENDED RELEASE ORAL at 23:05

## 2024-05-01 RX ADMIN — POTASSIUM CHLORIDE 10 MEQ: 750 TABLET, EXTENDED RELEASE ORAL at 14:05

## 2024-05-01 RX ADMIN — Medication 125 ML/HR: at 02:31

## 2024-05-01 RX ADMIN — Medication 250 ML/HR: at 01:56

## 2024-05-01 RX ADMIN — Medication 1 TABLET: at 08:04

## 2024-05-01 RX ADMIN — IBUPROFEN 600 MG: 600 TABLET, FILM COATED ORAL at 14:03

## 2024-05-01 RX ADMIN — DOCUSATE SODIUM 100 MG: 100 CAPSULE, LIQUID FILLED ORAL at 20:15

## 2024-05-01 RX ADMIN — TRAMADOL HYDROCHLORIDE 50 MG: 50 TABLET ORAL at 20:53

## 2024-05-01 RX ADMIN — POTASSIUM CHLORIDE 10 MEQ: 7.46 INJECTION, SOLUTION INTRAVENOUS at 06:21

## 2024-05-01 RX ADMIN — SODIUM CHLORIDE, POTASSIUM CHLORIDE, SODIUM LACTATE AND CALCIUM CHLORIDE 125 ML/HR: 600; 310; 30; 20 INJECTION, SOLUTION INTRAVENOUS at 00:46

## 2024-05-01 RX ADMIN — POTASSIUM CHLORIDE 40 MEQ: 750 TABLET, EXTENDED RELEASE ORAL at 14:03

## 2024-05-01 RX ADMIN — POTASSIUM CHLORIDE 40 MEQ: 750 TABLET, EXTENDED RELEASE ORAL at 10:49

## 2024-05-01 RX ADMIN — LIDOCAINE HYDROCHLORIDE 4 ML: 10; .005 INJECTION, SOLUTION EPIDURAL; INFILTRATION; INTRACAUDAL; PERINEURAL at 00:00

## 2024-05-01 RX ADMIN — POTASSIUM CHLORIDE 10 MEQ: 7.46 INJECTION, SOLUTION INTRAVENOUS at 03:25

## 2024-05-01 RX ADMIN — DOCUSATE SODIUM 100 MG: 100 CAPSULE, LIQUID FILLED ORAL at 08:05

## 2024-05-01 RX ADMIN — IBUPROFEN 600 MG: 600 TABLET, FILM COATED ORAL at 06:21

## 2024-05-01 RX ADMIN — POTASSIUM CHLORIDE 10 MEQ: 7.46 INJECTION, SOLUTION INTRAVENOUS at 04:55

## 2024-05-01 RX ADMIN — Medication 12 ML/HR: at 00:04

## 2024-05-01 RX ADMIN — LIDOCAINE HYDROCHLORIDE 3 ML: 10; .005 INJECTION, SOLUTION EPIDURAL; INFILTRATION; INTRACAUDAL; PERINEURAL at 00:01

## 2024-05-01 NOTE — PROGRESS NOTES
VAGINAL DELIVERY POSTPARTUM DAY OF DELIVERY    2024  PATIENT: Lara Tijerina        MR#:[id number]  LOCATION: [facility]  DATE OF ADMISSION: 2024  ADMISSION  DIAGNOSIS:    (normal spontaneous vaginal delivery)    Pregnant     CURRENT DIAGNOSIS: No notes have been filed under this hospital service.  Service: Hospitalist      SUBJECTIVE     Lara feels well.  Patient describes her lochia as less than menses.  Pain is well controlled.       OBJECTIVE   Temp: Temp:  [97.6 °F (36.4 °C)-98.7 °F (37.1 °C)] 97.6 °F (36.4 °C) Temp src: Oral   BP: BP: ()/(54-80) 94/64        Pulse: Heart Rate:  [63-98] 73  RR: Resp:  [16-20] 16    General:  Awake, alert, no acute distress   Cardiac: Regular rate and rhythm    Respiratory: Lungs clear bilaterally, normal respiratory effort    Abdomen: Soft, non-distended, fundus firm, below umbilicus, appropriately tender   Pelvis: deferred   Extremities: Calves NT bilaterally, DTR 2+, no clonus noted, trace edema     Lab Results   Component Value Date    WBC 18.36 (H) 2024    HGB 12.6 2024    HCT 36.8 2024     2024    AST 9 2024    ALT <5 2024    CREATININE 0.76 2024       ASSESSMENT: Day of vaginal delivery. Hgb: 12.6. Patient refuses any further potassium replacement IV for potassium 2.7 and requests only PO potassium instead.    PLAN: Doing well. Confer with pharmacy about switching patient over to PO potassium. Continue routine supportive postpartum care.     Nikki Hill CNM  09:17 EDT  May 1, 2024

## 2024-05-01 NOTE — OBED NOTES
[facility] Note OB    Patient Name: Lara Phillips  YOB: 2000  MRN: [id number]  Admission Date: 2024 10:57 PM  Date of Service: 2024    Chief Complaint: Laboring (Luz Elena- pt stated that she started having ctx at 1400 and the began to progress)        Subjective     Lara Phillips is a 24 y.o. female  at 38w1d with Estimated Date of Delivery: 24 who presents with the chief complaint listed above.  Reports contractions were occurring every 4 to 5 minutes since this afternoon and were 8 out of 10 in intensity.  Last cervical exam in the office was 3 cm.  Patient desires epidural.    She sees Jackie Verdin MD for her prenatal care. Her pregnancy has been complicated by: Rubella nonimmune, Rh-, history of depression, desires tubal, tobacco    She describes fetal movement as normal.  She denies rupture of membranes.  She denies vaginal bleeding. She is feeling contractions.        Objective   Patient Active Problem List    Diagnosis     Rubella non-immune status, antepartum [O09.899, Z28.39]     Rh negative, antepartum [O26.899, Z67.91]     Pregnancy [Z34.90]     Other constipation [K59.09]     Multigravida in second trimester [Z34.82]     Chlamydia infection affecting pregnancy in first trimester [O98.811, A74.9]     Tobacco smoking affecting pregnancy in first trimester [O99.331]     Nausea and vomiting during pregnancy [O21.9]         OB History    Para Term  AB Living   4 3 3 0 1 3   SAB IAB Ectopic Molar Multiple Live Births   1 0 0 0 1 3      # Outcome Date GA Lbr Peter/2nd Weight Sex Type Anes PTL Lv   4 Current            3 Term 19 37w3d 03:44 / 00:07 2767 g (6 lb 1.6 oz) M Vag-Spont EPI N JUAN MANUEL      Birth Comments: Scale 2      Name: INDIANA PHILLIPS      Apgar1: 9  Apgar5: 9   2A SAB            2B Term 04/23/15 38w1d / 00:42 3487 g (7 lb 11 oz) F Vag-Spont EPI N JUAN MANUEL      Apgar1: 9  Apgar5: 9   1 Term  38w0d  3487 g (7 lb 11 oz) F Vag-Spont   JUAN MANUEL        Past Medical  History:   Diagnosis Date    Herpes        History reviewed. No pertinent surgical history.    No current facility-administered medications on file prior to encounter.     Current Outpatient Medications on File Prior to Encounter   Medication Sig Dispense Refill    docusate sodium (Colace) 100 MG capsule Take 1 capsule by mouth 2 (Two) Times a Day. 60 capsule 1    doxylamine (UNISOM) 25 MG tablet Take 1 tablet by mouth At Night As Needed for Sleep. 45 tablet 2    famotidine (PEPCID) 20 MG tablet TAKE 1 TABLET BY MOUTH TWICE DAILY AS NEEDED FOR HEARTBURN 180 tablet 1    Prenatal Vit-Fe Fumarate-FA (Prenatal Vitamin) 27-0.8 MG tablet Take 1 tablet by mouth Daily. 90 tablet 4    promethazine (PHENERGAN) 12.5 MG tablet Take 1 tablet by mouth Every 6 (Six) Hours As Needed for Nausea or Vomiting. 30 tablet 0       Allergies   Allergen Reactions    Azithromycin Hives       Family History   Family history unknown: Yes       Social History     Socioeconomic History    Marital status: Single   Tobacco Use    Smoking status: Some Days     Types: Electronic Cigarette    Smokeless tobacco: Never   Vaping Use    Vaping status: Some Days   Substance and Sexual Activity    Alcohol use: No    Drug use: No    Sexual activity: Yes     Partners: Male     Birth control/protection: Same-sex partner           Review of Systems   Constitutional:  Negative for chills and fever.   HENT: Negative.     Eyes:  Negative for photophobia and visual disturbance.   Respiratory:  Negative for shortness of breath.    Cardiovascular:  Negative for chest pain.   Gastrointestinal:  Positive for abdominal pain. Negative for nausea.   Psychiatric/Behavioral:  The patient is not nervous/anxious.           PHYSICAL EXAM:      VITAL SIGNS:  Vitals:    04/30/24 2314 04/30/24 2322   BP: 113/72    Pulse: 71    Resp: 16    Temp: 98.3 °F (36.8 °C)    TempSrc: Oral    Weight:  73.7 kg (162 lb 6.4 oz)            FHT'S:    130 with moderate variability and  accelerations                                     PHYSICAL EXAM:      General: well developed; well nourished  no acute distress   Heart: Not performed.   Lungs   breathing is unlabored   Abdomen: Gravid and non tender     Extremities: trace edema, DTRs 1 plus, no clonus       Cervix:   Cervical Dilation (cm): 4-5  Cervical Effacement: 80%  Fetal Station: -1  Cervical Consistency: soft  Cervical Position: mid-position     Contractions:   regular                    LABS AND TESTING ORDERED:  Uterine and fetal monitoring  Urinalysis  Admit labs    LAB RESULTS:    Recent Results (from the past 24 hour(s))   POC Urinalysis Dipstick    Collection Time: 04/30/24 10:01 AM    Specimen: Urine   Result Value Ref Range    Glucose, UA Negative Negative mg/dL    Protein, POC Trace (A) Negative mg/dL       Lab Results   Component Value Date    ABO A 10/20/2023    RH Negative 10/20/2023       Lab Results   Component Value Date    STREPGPB Negative 04/19/2024                 External Prenatal Results       Pregnancy Outside Results - Transcribed From Office Records - See Scanned Records For Details       Test Value Date Time    ABO  A  10/20/23 1009    Rh  Negative  10/20/23 1009    Antibody Screen  Negative  02/02/24 1240       Negative  11/10/23 1231       Positive  10/20/23 1009       See Final Results  10/20/23 1009    Varicella IgG       Rubella  <0.90 index 10/20/23 1009    Hgb  13.0 g/dL 03/04/24 1330       12.1 g/dL 02/02/24 1240       14.3 g/dL 10/20/23 1009       14.8 g/dL 09/08/23 1611    Hct  37.7 % 03/04/24 1330       35.8 % 02/02/24 1240       41.1 % 10/20/23 1009       43.7 % 09/08/23 1611    Glucose Fasting GTT  72 mg/dL 02/06/24 1000    Glucose Tolerance Test 1 hour  77 mg/dL 02/06/24 1000    Glucose Tolerance Test 3 hour  62 mg/dL 02/06/24 1000    Gonorrhea (discrete)  Negative  09/29/23 0942       Negative  09/08/23 2029    Chlamydia (discrete)  Negative  09/29/23 0942       Negative  09/08/23 2029    RPR  Non  Reactive  10/20/23 1009    VDRL       Syphilis Antibody  Non Reactive  24     HBsAg  Negative  10/20/23 1009    Herpes Simplex Virus PCR       Herpes Simplex VIrus Culture       HIV  Non Reactive  10/20/23 1009    Hep C RNA Quant PCR       Hep C Antibody  Non Reactive  10/20/23 1009    AFP       Group B Strep  Negative  24 1330    GBS Susceptibility to Clindamycin       GBS Susceptibility to Erythromycin       Fetal Fibronectin       Genetic Testing, Maternal Blood                 Drug Screening       Test Value Date Time    Urine Drug Screen       Amphetamine Screen  Negative ng/mL 23 0959    Barbiturate Screen  Negative ng/mL 23 0959    Benzodiazepine Screen  Negative ng/mL 23 0959    Methadone Screen  Negative ng/mL 23 0959    Phencyclidine Screen  Negative ng/mL 23 0959    Opiates Screen  Negative  19 1640    THC Screen  Negative  19 1640    Cocaine Screen       Propoxyphene Screen  Negative ng/mL 23 0959    Buprenorphine Screen       Methamphetamine Screen       Oxycodone Screen  Negative  19 1640    Tricyclic Antidepressants Screen                 Legend    ^: Historical                              Impression:   @ 38w1d .  Final Diagnosis: Labor    Plan:  1.  Just with Dr. Bardales who will admit, fetal and uterine monitoring  continuously, expectant management, and analgesia with  epidural        Angelica Dallas MD  2024  23:26 EDT

## 2024-05-01 NOTE — PLAN OF CARE
Goal Outcome Evaluation:  Plan of Care Reviewed With: patient, significant other        Progress: improving  Outcome Evaluation: vss. ambulating independently. fundus and lochia wnl. pain controlled with prn meds. Pt currently on IV K+ protocol d/t a K+ of 2.7, pt will get 6 total doses of IV K+. bonding well with infant.

## 2024-05-01 NOTE — PLAN OF CARE
Goal Outcome Evaluation:  Plan of Care Reviewed With: patient        Progress: improving  Outcome Evaluation: vitals stable, assessment wdl, pain controlled per meds, potassium protcol, iv out

## 2024-05-01 NOTE — NURSING NOTE
This rn consulted with vinicio gauthier about switching potassium to tablet instead of iv due to the iv burning pt. Vinicio confirmed I could switch it and the order was potassium tablets. This rn consulted with pharmacy about dosage and confirmed with vinicio gauthier.

## 2024-05-01 NOTE — L&D DELIVERY NOTE
[facility]   Vaginal Delivery Note    Patient Name: Lara Tijerina  : 2000  MRN: [id number]    Date of Delivery: 2024     Diagnosis     Pre & Post-Delivery:  Intrauterine pregnancy at 38w2d  Labor status: Spontaneous Onset of Labor      (normal spontaneous vaginal delivery)    Pregnant             Problem List    Transfer to Postpartum     Review the Delivery Report for details.     Delivery     Delivery: Vaginal, Spontaneous     YOB: 2026    Time of Birth:  Gestational Age 1:38 AM   38w2d     Anesthesia: Epidural     Delivering clinician: Mehreen Carrillo    Forceps?   No   Vacuum? No    Shoulder dystocia present: No        Delivery narrative: Patient is a 24-year-old -0-1-2 presented at 38 weeks and 2 days with labor.  She was 4 to 5 cm on admission and painfully lara.  She was GBS negative.  She received an epidural for pain control.  After her epidural, she was 9 to 10 cm and had assisted rupture of membranes for clear fluid.      She pushed with 3 contractions and delivered a liveborn male infant in the OP presentation over an intact perineum.  After delivery of the fetal head, the infant was rotated counterclockwise and the right anterior shoulder easily delivered, followed by the remainder of the body.  The infant was immediately vigorous and placed on mom's chest.  Delayed cord clamping was performed for 30 seconds and cord was clamped and cut by the father.  Cord blood was collected and sent.  Her placenta then delivered spontaneously and was intact with a three-vessel cord.  Inspection of her perineum revealed no lacerations.  Her perineum was hemostatic.  Bimanual exam revealed a firm uterus with no remaining products of conception.  She tolerated the procedure well.  All counts were correct at the end of the procedure.      Infant     Findings: male  infant     Infant observations: Weight: 2900 g (6 lb 6.3 oz)   Length: 19  in  Observations/Comments:       "  Apgars: 8  @ 1 minute /    9  @ 5 minutes   Infant Name: Best     Placenta & Cord         Placenta delivered  Spontaneous  at   5/1/2024  1:40 AM     Cord: 3 vessels  present.   Nuchal Cord?  no   Cord blood obtained: Yes    Cord gases obtained:  No    Cord gas results: Venous:  No results found for: \"PHCVEN\", \"BECVEN\"    Arterial:  No results found for: \"PHCART\", \"BECART\"     Repair     Episiotomy: None     No    Lacerations: No   Estimated Blood Loss:       Quantitative Blood Loss: Quantitative Blood Loss (mL): 135 mL (05/01/24 0145)        Complications     none    Disposition     Mother to Mother Baby/Postpartum  in stable condition currently.  Baby to remains with mom  in stable condition currently.    Mehreen Carrillo MD  05/01/24  11:07 EDT        "

## 2024-05-01 NOTE — H&P
[facility]  Obstetric History and Physical    Chief Complaint   Patient presents with    Laboring     Tod- pt stated that she started having ctx at 1400 and the began to progress       Subjective     Patient is a 24 y.o. female  currently at 38w1d, who presents with labor.  Pregnancy has been uncomplicated.    The following portions of the patients history were reviewed and updated as appropriate: current medications, allergies, past medical history, past surgical history, past family history, past social history, and problem list .       Prenatal Information:  Prenatal Results       Initial Prenatal Labs       Test Value Reference Range Date Time    Hemoglobin  14.3 g/dL 11.1 - 15.9 10/20/23 1009       14.8 g/dL 12.0 - 15.9 23 1611    Hematocrit  41.1 % 34.0 - 46.6 10/20/23 1009       43.7 % 34.0 - 46.6 23 1611    Platelets  302 x10E3/uL 150 - 450 10/20/23 1009       299 10*3/mm3 140 - 450 23 1611    Rubella IgG  <0.90 index Immune >0.99 10/20/23 1009    Hepatitis B SAg  Negative  Negative 10/20/23 1009    Hepatitis C Ab  Non Reactive  Non Reactive 10/20/23 1009    RPR  Non Reactive  Non Reactive 10/20/23 1009    T. Pallidum Ab         ABO  A   10/20/23 1009    Rh  Negative   10/20/23 1009    Antibody Screen  Negative  Negative 11/10/23 1231       Positive  Negative 10/20/23 1009       See Final Results  Negative 10/20/23 1009    HIV  Non Reactive  Non Reactive 10/20/23 1009    Urine Culture  Final report   24 1445       Final report   23 0959       No growth   23 1901    Gonorrhea  Negative  Negative 23 0942       Negative  Negative 23 202    Chlamydia  Negative  Negative 23 0942       Negative  Negative 23 202    TSH        HgB A1c         Varicella IgG        HgB Electrophoresis         Cystic fibrosis                   Fetal testing        Test Value Reference Range Date Time    NIPT        MSAFP        AFP-4                  2nd and 3rd  Trimester       Test Value Reference Range Date Time    Hemoglobin (repeated)  13.0 g/dL 12.0 - 15.9 03/04/24 1330       12.1 g/dL 12.0 - 15.9 02/02/24 1240    Hematocrit (repeated)  37.7 % 34.0 - 46.6 03/04/24 1330       35.8 % 34.0 - 46.6 02/02/24 1240    Platelets   207 10*3/mm3 140 - 450 03/04/24 1330       330 10*3/mm3 140 - 450 02/02/24 1240       302 x10E3/uL 150 - 450 10/20/23 1009       299 10*3/mm3 140 - 450 09/08/23 1611    GCT  144 mg/dL 65 - 139 02/02/24 1240    Antibody Screen (repeated)  Negative  Negative 02/02/24 1240    3rd TM syphilis scrn (repeated)  RPR         3rd TM syphilis scrn (repeated) FTA  Non Reactive  Non Reactive 03/08/24     GTT Fasting  72 mg/dL 65 - 94 02/06/24 1000    GTT 1 Hr  77 mg/dL 65 - 179 02/06/24 1000    GTT 2 Hr  53 mg/dL 65 - 154 02/06/24 1000    GTT 3 Hr  62 mg/dL 65 - 139 02/06/24 1000    Group B Strep  Negative  Negative 04/19/24 1330              Other testing        Test Value Reference Range Date Time    Parvo IgG         CMV IgG                   Drug Screening       Test Value Reference Range Date Time    Amphetamine Screen  Negative ng/mL Owcrjt=7974 09/29/23 0959    Barbiturate Screen  Negative ng/mL Qgxrzw=282 09/29/23 0959    Benzodiazepine Screen  Negative ng/mL Jkzsie=871 09/29/23 0959    Methadone Screen  Negative ng/mL Eoeegw=170 09/29/23 0959    Phencyclidine Screen  Negative ng/mL Cutoff=25 09/29/23 0959    Opiates Screen  Negative ng/mL Miiidl=298 09/29/23 0959    THC Screen  Negative ng/mL Cutoff=50 09/29/23 0959    Cocaine Screen  Negative ng/mL Sozimj=518 09/29/23 0959    Propoxyphene Screen  Negative ng/mL Blusst=886 09/29/23 0959    Buprenorphine Screen        Methamphetamine Screen        Oxycodone Screen        Tricyclic Antidepressants Screen                  Legend    ^: Historical                          External Prenatal Results       Pregnancy Outside Results - Transcribed From Office Records - See Scanned Records For Details       Test  Value Date Time    ABO  A  10/20/23 1009    Rh  Negative  10/20/23 1009    Antibody Screen  Negative  02/02/24 1240       Negative  11/10/23 1231       Positive  10/20/23 1009       See Final Results  10/20/23 1009    Varicella IgG       Rubella  <0.90 index 10/20/23 1009    Hgb  13.0 g/dL 03/04/24 1330       12.1 g/dL 02/02/24 1240       14.3 g/dL 10/20/23 1009       14.8 g/dL 09/08/23 1611    Hct  37.7 % 03/04/24 1330       35.8 % 02/02/24 1240       41.1 % 10/20/23 1009       43.7 % 09/08/23 1611    Glucose Fasting GTT  72 mg/dL 02/06/24 1000    Glucose Tolerance Test 1 hour  77 mg/dL 02/06/24 1000    Glucose Tolerance Test 3 hour  62 mg/dL 02/06/24 1000    Gonorrhea (discrete)  Negative  09/29/23 0942       Negative  09/08/23 2029    Chlamydia (discrete)  Negative  09/29/23 0942       Negative  09/08/23 2029    RPR  Non Reactive  10/20/23 1009    VDRL       Syphilis Antibody  Non Reactive  03/08/24     HBsAg  Negative  10/20/23 1009    Herpes Simplex Virus PCR       Herpes Simplex VIrus Culture       HIV  Non Reactive  10/20/23 1009    Hep C RNA Quant PCR       Hep C Antibody  Non Reactive  10/20/23 1009    AFP       Group B Strep  Negative  04/19/24 1330    GBS Susceptibility to Clindamycin       GBS Susceptibility to Erythromycin       Fetal Fibronectin       Genetic Testing, Maternal Blood                 Drug Screening       Test Value Date Time    Urine Drug Screen       Amphetamine Screen  Negative ng/mL 09/29/23 0959    Barbiturate Screen  Negative ng/mL 09/29/23 0959    Benzodiazepine Screen  Negative ng/mL 09/29/23 0959    Methadone Screen  Negative ng/mL 09/29/23 0959    Phencyclidine Screen  Negative ng/mL 09/29/23 0959    Opiates Screen  Negative  09/22/19 1640    THC Screen  Negative  09/22/19 1640    Cocaine Screen       Propoxyphene Screen  Negative ng/mL 09/29/23 0959    Buprenorphine Screen       Methamphetamine Screen       Oxycodone Screen  Negative  09/22/19 1640    Tricyclic Antidepressants  Screen                 Legend    ^: Historical                             Past OB History:     OB History    Para Term  AB Living   4 3 3 0 1 3   SAB IAB Ectopic Molar Multiple Live Births   1 0 0 0 1 3      # Outcome Date GA Lbr Peter/2nd Weight Sex Type Anes PTL Lv   4 Current            3 Term 19 37w3d 03:44 / 00:07 2767 g (6 lb 1.6 oz) M Vag-Spont EPI N JUAN MANUEL      Birth Comments: Scale 2      Name: INDIANA PHILLIPS      Apgar1: 9  Apgar5: 9   2A SAB            2B Term 04/23/15 38w1d / 00:42 3487 g (7 lb 11 oz) F Vag-Spont EPI N JUAN MANUEL      Apgar1: 9  Apgar5: 9   1 Term  38w0d  3487 g (7 lb 11 oz) F Vag-Spont   JUAN MANUEL       Past Medical History: Past Medical History:   Diagnosis Date    Herpes       Past Surgical History History reviewed. No pertinent surgical history.   Family History: Family History   Family history unknown: Yes      Social History:  reports that she has been smoking electronic cigarette. She has never used smokeless tobacco.   reports no history of alcohol use.   reports no history of drug use.        General ROS: Pertinent items are noted in HPI, all other systems reviewed and negative    Objective       Vital Signs Range for the last 24 hours  Temperature: Temp:  [98.3 °F (36.8 °C)] 98.3 °F (36.8 °C)   Temp Source: Temp src: Oral   BP: BP: (113-117)/(72-79) 113/72   Pulse: Heart Rate:  [71] 71   Respirations: Resp:  [16] 16   SPO2:     O2 Amount (l/min):     O2 Devices     Weight: Weight:  [73.5 kg (162 lb)-73.7 kg (162 lb 6.4 oz)] 73.7 kg (162 lb 6.4 oz)     Physical Examination: General appearance - alert, well appearing, and in no distress  Chest - clear to auscultation, no wheezes, rales or rhonchi, symmetric air entry  Heart - normal rate and regular rhythm  Abdomen - gravid, nontender  Pelvic - Cervix 4-5 cm, 80%, -1  Extremities - pedal edema trace +    Presentation: vertex   Cervix: Exam by: Method: sterile exam per RN ()   Dilation: Cervical Dilation (cm): 4-5    Effacement: Cervical Effacement: 80%   Station:         Fetal Heart Rate Assessment   Method:     Beats/min:     Baseline:     Variability:     Accels:     Decels:     Tracing Category:       Uterine Assessment   Method:     Frequency (min):     Ctx Count in 10 min:     Duration:     Intensity:     Intensity by IUPC:     Resting Tone:     Resting Tone by IUPC:     [place] Units:         Assessment & Plan       Pregnant        Assessment:  1.  Intrauterine pregnancy at 38w1d gestation with reassuring fetal status.    2.  labor  without ROM  3.  Obstetrical history significant for is non-contributory.  4.  GBS status:   Strep Gp B Culture   Date Value Ref Range Status   2024 Negative Negative Final     Comment:     Centers for Disease Control and Prevention (CDC) and American Congress  of Obstetricians and Gynecologists (ACOG) guidelines for prevention of   group B streptococcal (GBS) disease specify co-collection of  a vaginal and rectal swab specimen to maximize sensitivity of GBS  detection. Per the CDC and ACOG, swabbing both the lower vagina and  rectum substantially increases the yield of detection compared with  sampling the vagina alone.  Penicillin G, ampicillin, or cefazolin are indicated for intrapartum  prophylaxis of  GBS colonization. Reflex susceptibility  testing should be performed prior to use of clindamycin only on GBS  isolates from penicillin-allergic women who are considered a high risk  for anaphylaxis. Treatment with vancomycin without additional testing  is warranted if resistance to clindamycin is noted.         Plan:  1. fetal and uterine monitoring  continuously, expectant management, and analgesia with  epidural  2. Plan of care has been reviewed with patient.  3.  Risks, benefits of treatment plan have been discussed.  4.  All questions have been answered.      Mehreen Carrillo MD  2024  23:38 EDT

## 2024-05-01 NOTE — LACTATION NOTE
"This note was copied from a baby's chart.  P3 term baby, 11hrs old. Mom reports baby has been nursing well. Baby has had 2 wet and 2 stools. Mom nursed her other children \"well with a good milk supply\" and reports being nervous because it's been 4yrs since she last breast fed.  Reviewed how to know baby is getting enough milk, feeding cues, expected output, nursing on demand or every 3hrs and encouraged to call for any assistance.  Prescription faxed for Spectra pump.  "

## 2024-05-01 NOTE — ANESTHESIA POSTPROCEDURE EVALUATION
Patient: Lara Tijerina    Procedure Summary       Date: 04/30/24 Room / Location:     Anesthesia Start: 2351 Anesthesia Stop: 05/01/24 0138    Procedure: LABOR ANALGESIA Diagnosis:     Scheduled Providers:  Provider: Martin Dominguez MD    Anesthesia Type: epidural ASA Status: 2            Anesthesia Type: epidural    Vitals  Vitals Value Taken Time   /79 05/01/24 0435   Temp 36.5 °C (97.7 °F) 05/01/24 0435   Pulse 76 05/01/24 0435   Resp 16 05/01/24 0435   SpO2 99 % 05/01/24 0234   Vitals shown include unfiled device data.        Anesthesia Post Evaluation

## 2024-05-01 NOTE — ANESTHESIA PROCEDURE NOTES
Labor Epidural      Patient reassessed immediately prior to procedure    Patient location during procedure: OB  Performed By  Anesthesiologist: Martin Dominguez MD  Preanesthetic Checklist  Completed: patient identified and risks and benefits discussed  Additional Notes  19 gauge catheter.    Gestational Age 38w2d  Prep:  Pt Position:sitting  Sterile Tech:gloves, mask and sterile barrier  Prep:chlorhexidine gluconate and isopropyl alcohol  Monitoring:blood pressure monitoring and EKG  Epidural Block Procedure:  Approach:midline  Guidance:landmark technique and palpation technique  Location:L4-L5  Needle Type:Tuohy  Needle Gauge:17  Loss of Resistance Medium: saline  Loss of Resistance: 4cm  Cath Depth at skin:9 cm  Paresthesia: none  Aspiration:negative  Test Dose:negative  Post Assessment:  Dressing:occlusive dressing applied and secured with tape  Pt Tolerance:patient tolerated the procedure well with no apparent complications             No

## 2024-05-01 NOTE — LACTATION NOTE
This note was copied from a baby's chart.  Delivered Spectra pump. Mom will call for education on her pump later.

## 2024-05-01 NOTE — ANESTHESIA PREPROCEDURE EVALUATION
Anesthesia Evaluation                  Airway   Mallampati: II  Dental      Pulmonary    (+) a smoker Current, Abstained day of surgery,  (-) sleep apnea    ROS comment: Negative patient screen for ZOFIA    Cardiovascular         Neuro/Psych  GI/Hepatic/Renal/Endo      Musculoskeletal     Abdominal    Substance History      OB/GYN    (+) Pregnant  (-) Preeclampsia and history of pregnancy induced hypertension        Other        (-) blood dyscrasia              Anesthesia Plan    ASA 2     epidural     (Intrauterine pregnancy at 38w1d)    Anesthetic plan, risks, benefits, and alternatives have been provided, discussed and informed consent has been obtained with: patient.    CODE STATUS:    Level Of Support Discussed With: Patient  Code Status (Patient has no pulse and is not breathing): CPR (Attempt to Resuscitate)  Medical Interventions (Patient has pulse or is breathing): Full Support

## 2024-05-02 VITALS
HEART RATE: 81 BPM | BODY MASS INDEX: 27.06 KG/M2 | TEMPERATURE: 98.3 F | WEIGHT: 162.4 LBS | HEIGHT: 65 IN | OXYGEN SATURATION: 99 % | SYSTOLIC BLOOD PRESSURE: 113 MMHG | RESPIRATION RATE: 16 BRPM | DIASTOLIC BLOOD PRESSURE: 78 MMHG

## 2024-05-02 PROBLEM — Z34.90 PREGNANT: Status: RESOLVED | Noted: 2024-04-30 | Resolved: 2024-05-02

## 2024-05-02 LAB
BACTERIA SPEC AEROBE CULT: NO GROWTH
BURR CELLS BLD QL SMEAR: ABNORMAL
DEPRECATED RDW RBC AUTO: 49.9 FL (ref 37–54)
ERYTHROCYTE [DISTWIDTH] IN BLOOD BY AUTOMATED COUNT: 14.5 % (ref 12.3–15.4)
HCT VFR BLD AUTO: 30.9 % (ref 34–46.6)
HGB BLD-MCNC: 10.4 G/DL (ref 12–15.9)
HYPOCHROMIA BLD QL: ABNORMAL
LYMPHOCYTES # BLD MANUAL: 3.28 10*3/MM3 (ref 0.7–3.1)
LYMPHOCYTES NFR BLD MANUAL: 5.1 % (ref 5–12)
MCH RBC QN AUTO: 31.5 PG (ref 26.6–33)
MCHC RBC AUTO-ENTMCNC: 33.7 G/DL (ref 31.5–35.7)
MCV RBC AUTO: 93.6 FL (ref 79–97)
MONOCYTES # BLD: 0.72 10*3/MM3 (ref 0.1–0.9)
NEUTROPHILS # BLD AUTO: 10.13 10*3/MM3 (ref 1.7–7)
NEUTROPHILS NFR BLD MANUAL: 71.7 % (ref 42.7–76)
NRBC BLD AUTO-RTO: 0 /100 WBC (ref 0–0.2)
OVALOCYTES BLD QL SMEAR: ABNORMAL
PLAT MORPH BLD: NORMAL
PLATELET # BLD AUTO: 252 10*3/MM3 (ref 140–450)
PMV BLD AUTO: 9.5 FL (ref 6–12)
POLYCHROMASIA BLD QL SMEAR: ABNORMAL
POTASSIUM SERPL-SCNC: 3.7 MMOL/L (ref 3.5–5.2)
RBC # BLD AUTO: 3.3 10*6/MM3 (ref 3.77–5.28)
VARIANT LYMPHS NFR BLD MANUAL: 23.2 % (ref 19.6–45.3)
WBC MORPH BLD: NORMAL
WBC NRBC COR # BLD AUTO: 14.13 10*3/MM3 (ref 3.4–10.8)

## 2024-05-02 PROCEDURE — 90707 MMR VACCINE SC: CPT

## 2024-05-02 PROCEDURE — 0503F POSTPARTUM CARE VISIT: CPT

## 2024-05-02 PROCEDURE — 85025 COMPLETE CBC W/AUTO DIFF WBC: CPT | Performed by: OBSTETRICS & GYNECOLOGY

## 2024-05-02 PROCEDURE — 85007 BL SMEAR W/DIFF WBC COUNT: CPT | Performed by: OBSTETRICS & GYNECOLOGY

## 2024-05-02 PROCEDURE — 84132 ASSAY OF SERUM POTASSIUM: CPT | Performed by: OBSTETRICS & GYNECOLOGY

## 2024-05-02 PROCEDURE — 90471 IMMUNIZATION ADMIN: CPT

## 2024-05-02 PROCEDURE — 25010000002 MEASLES, MUMPS & RUBELLA VAC RECONSTITUTED SOLUTION

## 2024-05-02 RX ORDER — IBUPROFEN 600 MG/1
600 TABLET ORAL EVERY 6 HOURS PRN
Qty: 60 TABLET | Refills: 0 | Status: SHIPPED | OUTPATIENT
Start: 2024-05-02

## 2024-05-02 RX ORDER — DOCUSATE SODIUM 100 MG/1
100 CAPSULE, LIQUID FILLED ORAL 2 TIMES DAILY
Qty: 60 CAPSULE | Refills: 1 | Status: SHIPPED | OUTPATIENT
Start: 2024-05-02

## 2024-05-02 RX ADMIN — Medication 1 TABLET: at 08:46

## 2024-05-02 RX ADMIN — IBUPROFEN 600 MG: 600 TABLET, FILM COATED ORAL at 08:51

## 2024-05-02 RX ADMIN — VARICELLA VIRUS VACCINE LIVE 0.5 ML: 1350 INJECTION, POWDER, LYOPHILIZED, FOR SUSPENSION SUBCUTANEOUS at 16:57

## 2024-05-02 RX ADMIN — MEASLES, MUMPS, AND RUBELLA VIRUS VACCINE LIVE 0.5 ML: 1000; 12500; 1000 INJECTION, POWDER, LYOPHILIZED, FOR SUSPENSION SUBCUTANEOUS at 16:58

## 2024-05-02 RX ADMIN — POTASSIUM CHLORIDE 40 MEQ: 750 TABLET, EXTENDED RELEASE ORAL at 08:46

## 2024-05-02 RX ADMIN — POTASSIUM CHLORIDE 40 MEQ: 750 TABLET, EXTENDED RELEASE ORAL at 04:07

## 2024-05-02 RX ADMIN — DOCUSATE SODIUM 100 MG: 100 CAPSULE, LIQUID FILLED ORAL at 08:46

## 2024-05-02 NOTE — PROGRESS NOTES
VAGINAL DELIVERY POSTPARTUM DAY 1    2024  PATIENT: Lara Tijerina        MR#:[id number]  LOCATION: [facility]  DATE OF ADMISSION: 2024  ADMISSION  DIAGNOSIS:    (normal spontaneous vaginal delivery)    Pregnant     CURRENT DIAGNOSIS: No notes have been filed under this hospital service.  Service: Hospitalist      SUBJECTIVE     Lara feels well.  Patient describes her lochia as less than menses.  Pain is well controlled.        OBJECTIVE   Temp: Temp:  [97.7 °F (36.5 °C)-98.3 °F (36.8 °C)] 98.3 °F (36.8 °C) Temp src: Oral   BP: BP: (104-113)/(70-78) 113/78        Pulse: Heart Rate:  [79-83] 81  RR: Resp:  [16] 16    General:  Awake, alert, no acute distress   Cardiac: Regular rate and rhythm    Respiratory: Lungs clear bilaterally, normal respiratory effort    Abdomen: Soft, non-distended, fundus firm, below umbilicus, appropriately tender   Pelvis: deferred   Extremities: Calves NT bilaterally, DTR 2+, no clonus noted, trace edema     Lab Results   Component Value Date    WBC 14.13 (H) 2024    HGB 10.4 (L) 2024    HCT 30.9 (L) 2024     2024    AST 9 2024    ALT <5 2024    CREATININE 0.76 2024       ASSESSMENT:  Postpartum day 1 after vaginal delivery. Hgb: 10.4.    PLAN:Doing well. Continue routine supportive care. Discontinue IV, advance diet, may shower. Plan for discharge tomorrow as clinical picture allows.     Nikki Hill CNM  11:22 EDT  May 2, 2024

## 2024-05-02 NOTE — DISCHARGE SUMMARY
VAGINAL DELIVERY DISCHARGE SUMMARY      PATIENT: Lara Tijerina        MR#:[id number]  LOCATION: [facility]  ADMISSION  DIAGNOSIS:    (normal spontaneous vaginal delivery)    Pregnant     DISCHARGE DIAGNOSIS: No diagnosis found.      DATE OF ADMISSION: 2024  DATE OF DISCHARGE: 24     PROCEDURES:  Vaginal, Spontaneous     2024    1:38 AM      SERVICE: Obstetrics    HOSPITAL COURSE: Lara underwent vaginal delivery of a female infant and remained in the hospital for 2 days. During that time, Lara remained afebrile and hemodynamically stable. On the day of discharge, Lara was eating, ambulating and voiding without difficulty.      VARICELLA: non-immune - varicella immunization ordered to be given on postpartum prior to discharge.  RUBELLA: non-immune - MMR vaccine ordered to be given on postpartum prior to discharge.    LABS:   Lab Results   Component Value Date    WBC 14.13 (H) 2024    HGB 10.4 (L) 2024    HCT 30.9 (L) 2024    MCV 93.6 2024     2024    GLU 78 2019    CREATININE 0.76 2024    AST 9 2024    ALT <5 2024     Results from last 7 days   Lab Units 24  2337   ABO TYPING  A   RH TYPING  Negative   ANTIBODY SCREEN  Positive       DISCHARGE MEDICATIONS     Discharge Medications        New Medications        Instructions Start Date   ibuprofen 600 MG tablet  Commonly known as: ADVIL,MOTRIN   600 mg, Oral, Every 6 Hours PRN             Continue These Medications        Instructions Start Date   docusate sodium 100 MG capsule  Commonly known as: Colace   100 mg, Oral, 2 Times Daily      famotidine 20 MG tablet  Commonly known as: PEPCID   20 mg, Oral, 2 Times Daily PRN      Prenatal Vitamin 27-0.8 MG tablet   1 tablet, Oral, Daily             Stop These Medications      doxylamine 25 MG tablet  Commonly known as: UNISOM     promethazine 12.5 MG tablet  Commonly known as: PHENERGAN              DISCHARGE DISPOSITION:  Home    DISCHARGE CONDITION: Stable    DISCHARGE DIET: Regular    ACTIVITY AT DISCHARGE: Pelvic rest    INFANT FEEDING PLANS: Breast    EDUCATION: Warning signs and symptoms given, no tub baths, nothing in the vagina for 6 weeks.     FOLLOW-UP APPOINTMENTS: Follow up with [facility]  in 4 to 6 weeks for routine postpartum visit.     Nikki Hill CNM  05/02/24  14:56 EDT

## 2024-05-02 NOTE — LACTATION NOTE
Pt reports BF is going well. She had questions about pumping and storing milk for when she goes back to work. Educated on baby's expected output and weight gain. Pt has [facility] info    Lactation Consult Note    Evaluation Completed: 2024 08:12 EDT  Patient Name: Lara Tijerina  :  2000  MRN:  [id number]     REFERRAL  INFORMATION:                                         DELIVERY HISTORY:        Skin to skin initiation date/time: 2024  1:38 AM   Skin to skin end date/time:           MATERNAL ASSESSMENT:                               INFANT ASSESSMENT:  Information for the patient's :  Malini Tijerina [[id number]]   No past medical history on file.                                                                                                   MATERNAL INFANT FEEDING:                                                                       EQUIPMENT TYPE:                                 BREAST PUMPING:          LACTATION REFERRALS:

## 2024-05-02 NOTE — NURSING NOTE
Nikki Hill, EDEL updated via phone with potassium results of 3.7 CNM states no further orders at this time.

## 2024-05-04 LAB
BH BB BLOOD EXPIRATION DATE: NORMAL
BH BB BLOOD EXPIRATION DATE: NORMAL
BH BB BLOOD TYPE BARCODE: 600
BH BB BLOOD TYPE BARCODE: 600
BH BB DISPENSE STATUS: NORMAL
BH BB DISPENSE STATUS: NORMAL
BH BB PRODUCT CODE: NORMAL
BH BB PRODUCT CODE: NORMAL
BH BB UNIT NUMBER: NORMAL
BH BB UNIT NUMBER: NORMAL
CROSSMATCH INTERPRETATION: NORMAL
CROSSMATCH INTERPRETATION: NORMAL
UNIT  ABO: NORMAL
UNIT  ABO: NORMAL
UNIT  RH: NORMAL
UNIT  RH: NORMAL

## 2024-05-13 ENCOUNTER — MATERNAL SCREENING (OUTPATIENT)
Dept: CALL CENTER | Facility: HOSPITAL | Age: 24
End: 2024-05-13
Payer: COMMERCIAL

## 2024-05-13 NOTE — OUTREACH NOTE
Maternal Screening Survey      Flowsheet Row Responses   [facility]   [facility] successful? Yes   Call start time 1357   Call end time 1359   EPD Scale: Able to Laugh 0-->as much as she always could   EPD Scale: Looked Forward 0-->as much as she ever did   EPD Scale: Blamed Self 0-->no, never   EPD Scale: Been Anxious 0-->no, not at all   EPD Scale: Felt Panicky 0-->no, not at all   EPD Scale: Things Getting on Top 0-->no, has been coping as well as ever   EPD Scale: Difficulty Sleeping 0-->no, not at all   EPD Scale: Sad or Miserable 0-->no, not at all   EPD Scale: Crying 0-->no, never   EPD Scale: Thought of Harming Self 0-->never   [place]  Depression Score 0   Did any of your parents have problems with alcohol or drug use? No   Do any of your peers have problems with alcohol or drug use? No   Does your partner have problems with alcohol or drug use? No   Before you were pregnant did you have problems with alcohol or drug use? (past) No   In the past month, did you drink beer, wine, liquor or use any other drugs? (pregnancy) No   Maternal Screening call completed Yes   Call end time 1351              Linda SUMMERS - Registered Nurse

## 2024-05-13 NOTE — OUTREACH NOTE
Maternal Screening Survey      Flowsheet Row Responses   Facility patient discharged from? [facility]   Attempt successful? No   Unsuccessful attempts Attempt 1              Linda SUMMERS - Registered Nurse

## 2024-06-14 ENCOUNTER — POSTPARTUM VISIT (OUTPATIENT)
Dept: OBSTETRICS AND GYNECOLOGY | Facility: CLINIC | Age: 24
End: 2024-06-14
Payer: COMMERCIAL

## 2024-06-14 VITALS
HEIGHT: 65 IN | DIASTOLIC BLOOD PRESSURE: 70 MMHG | SYSTOLIC BLOOD PRESSURE: 103 MMHG | HEART RATE: 67 BPM | WEIGHT: 143.6 LBS | BODY MASS INDEX: 23.93 KG/M2

## 2024-06-14 DIAGNOSIS — Z30.09 BIRTH CONTROL COUNSELING: ICD-10-CM

## 2024-06-14 DIAGNOSIS — Z32.02 ENCOUNTER FOR PREGNANCY TEST WITH RESULT NEGATIVE: Primary | ICD-10-CM

## 2024-06-14 LAB
B-HCG UR QL: NEGATIVE
EXPIRATION DATE: NORMAL
INTERNAL NEGATIVE CONTROL: NEGATIVE
INTERNAL POSITIVE CONTROL: POSITIVE
Lab: NORMAL

## 2024-06-14 NOTE — PROGRESS NOTES
Chief Complaint   Patient presents with    Postpartum Care     Patient here for postpartum visit. Patient delivered 24-vag-male  Pap 2023-neg; Patient would like to get Nexplanon today.        SUBJECTIVE:   Lara Tijerina is a 24 y.o.  who presents s/p  Spontaneous Vaginal Delivery on 24.  Reports starting her period and it is quite heavy.  Bowel and bladder function have returned to normal  Mood changes - some mood changes but no SI, overall doing well, EPDS 0  Formula feeding     OB History    Para Term  AB Living   4 3 3   1 3   SAB IAB Ectopic Molar Multiple Live Births   1       0 3      # Outcome Date GA Lbr Peter/2nd Weight Sex Type Anes PTL Lv   4 Term 24 38w2d 02:11 / 00:13 2900 g (6 lb 6.3 oz) M Vag-Spont EPI N JUAN MANUEL   3 Term 19 37w3d 03:44 / 00:07 2767 g (6 lb 1.6 oz) M Vag-Spont EPI N JUAN MANUEL      Birth Comments: Scale 2   2 Term 04/23/15 38w1d / 00:42 3487 g (7 lb 11 oz) F Vag-Spont EPI N JUAN MANUEL   1 SAB  8w0d                 History reviewed. No pertinent past medical history.  History reviewed. No pertinent surgical history.  Social History     Tobacco Use    Smoking status: Every Day     Current packs/day: 0.50     Average packs/day: 0.5 packs/day for 24.5 years (12.2 ttl pk-yrs)     Types: Electronic Cigarette, Cigarettes     Start date:      Passive exposure: Current    Smokeless tobacco: Never   Vaping Use    Vaping status: Every Day    Substances: Nicotine    Devices: Disposable   Substance Use Topics    Alcohol use: No    Drug use: No     Family History   Problem Relation Age of Onset    Breast cancer Neg Hx     Ovarian cancer Neg Hx     Uterine cancer Neg Hx     Colon cancer Neg Hx        Patient Active Problem List   Diagnosis    Tobacco smoking affecting pregnancy in first trimester    Nausea and vomiting during pregnancy    Chlamydia infection affecting pregnancy in first trimester    Multigravida in second trimester    Other constipation    Pregnancy     "Rh negative, antepartum    Rubella non-immune status, antepartum    Pregnant     (normal spontaneous vaginal delivery)        OBJECTIVE:   /70   Pulse 67   Ht 165.1 cm (65\")   Wt 65.1 kg (143 lb 9.6 oz)   LMP  (LMP Unknown) Comment: pt states July, her periods are irregular  Breastfeeding No   BMI 23.90 kg/m²    Physical Examination:   General appearance - alert, well appearing, and in no distress  Breasts - normal, no masses bilaterally, no nipple retraction,   Chest - no tachypnea, retractions or cyanosis  Heart - normal rate and regular rhythm  Abdomen - soft, nontender, nondistended, no masses or organomegaly;   Pelvic - normal external genitalia, vulva, vagina, cervix, uterus and adnexa, + bleeding  Neurological - screening mental status exam normal  Musculoskeletal - no joint tenderness, deformity or swelling  Psychiatric - normal mood and affect    Lab Results   Component Value Date    WBC 14.13 (H) 2024    HGB 10.4 (L) 2024    HCT 30.9 (L) 2024    MCV 93.6 2024     2024        ASSESSMENT:   (Z32.02) Encounter for pregnancy test with result negative - Plan: POC Pregnancy, Urine      PLAN:   Doing well postpartum  Baby doing well  Contraception - desires Nexplanon  At this time, may resume normal activity including intercourse and lifting.  Reviewed normal precautions.  Reviewed last pap smear 2023 NIL  Recommended follow up for annual exam or sooner with problems    Procedure Note     Pre-Operative Diagnosis: 1. Desire for long acting reversible contraception with Nexplanon    Post-Operative Diagnosis: Same     Procedure: Nexplanon insertion in left arm     Anesthetic: 2mL of 1% lidocaine without epinephrine    Estimated Blood Loss: Nil     Complications: no complications were noted     Disposition: home in good condition     Description of Procedure:   Patient was consented for procedure; questions were answered. Urine pregnancy test was negative.  " Patient's dominant hand is her right hand.  Risks were explained to be including but not limited to bruising, bleeding, need for additional surgeries or procedures, injury to surrounding vessels or nerves.  Insertion site was measured, 8cm from medial epicondyle per 's instructions. 2 mL of 1% lidocaine injected at insertion site and along insertion track. The skin was then prepped with betadine. The device was inserted in compliance with the 's instructions. The patient was instructed to feel the device after placement. A bandage was applied to the insertion site. Hemostasis was noted. Patient tolerated the procedure well. She was given the package insert and insertion card and instructed to have the device removed in 3 years.       Jackie Verdin MD

## 2024-07-02 ENCOUNTER — PATIENT MESSAGE (OUTPATIENT)
Dept: OBSTETRICS AND GYNECOLOGY | Facility: CLINIC | Age: 24
End: 2024-07-02
Payer: COMMERCIAL

## 2024-07-02 RX ORDER — CEPHALEXIN 500 MG/1
500 CAPSULE ORAL 4 TIMES DAILY
Qty: 40 CAPSULE | Refills: 0 | Status: SHIPPED | OUTPATIENT
Start: 2024-07-02 | End: 2024-07-12

## 2024-07-02 NOTE — TELEPHONE ENCOUNTER
Please see message. Patient has ?boils on buttocks and would like something called to pharmacy. She started a new job and can't miss work. Please advise.